# Patient Record
Sex: FEMALE | Race: BLACK OR AFRICAN AMERICAN | Employment: UNEMPLOYED | URBAN - METROPOLITAN AREA
[De-identification: names, ages, dates, MRNs, and addresses within clinical notes are randomized per-mention and may not be internally consistent; named-entity substitution may affect disease eponyms.]

---

## 2017-05-16 ENCOUNTER — HOSPITAL ENCOUNTER (OUTPATIENT)
Dept: LAB | Age: 57
Discharge: HOME | End: 2017-05-16

## 2017-05-16 LAB
CREAT UR-MCNC: 275 MG/DL
MICROALBUMIN UR-MCNC: 15.9 MG/DL
MICROALBUMIN/CREAT UR-RTO: 58 MG/G (ref 0–30)

## 2017-05-16 PROCEDURE — 82570 ASSAY OF URINE CREATININE: CPT | Performed by: INTERNAL MEDICINE

## 2017-05-16 PROCEDURE — 82043 UR ALBUMIN QUANTITATIVE: CPT | Performed by: INTERNAL MEDICINE

## 2018-03-05 ENCOUNTER — HOSPITAL ENCOUNTER (OUTPATIENT)
Dept: ULTRASOUND IMAGING | Age: 58
Discharge: HOME | End: 2018-03-05
Attending: GENERAL ACUTE CARE HOSPITAL
Payer: MEDICARE

## 2018-03-05 DIAGNOSIS — N20.0 CALCULUS OF KIDNEY: ICD-10-CM

## 2018-03-05 PROCEDURE — 76770 US EXAM ABDO BACK WALL COMP: CPT

## 2019-08-14 ENCOUNTER — HOSPITAL ENCOUNTER (OUTPATIENT)
Dept: MAMMOGRAPHY | Age: 59
Discharge: HOME | End: 2019-08-14
Attending: INTERNAL MEDICINE
Payer: MEDICARE

## 2019-08-14 DIAGNOSIS — Z12.39 SCREENING BREAST EXAMINATION: ICD-10-CM

## 2019-08-14 PROCEDURE — 77067 SCR MAMMO BI INCL CAD: CPT

## 2020-08-18 ENCOUNTER — HOSPITAL ENCOUNTER (OUTPATIENT)
Dept: MAMMOGRAPHY | Age: 60
Discharge: HOME | End: 2020-08-18
Attending: INTERNAL MEDICINE
Payer: MEDICARE

## 2020-08-18 DIAGNOSIS — Z12.31 SCREENING MAMMOGRAM FOR HIGH-RISK PATIENT: ICD-10-CM

## 2020-08-18 PROCEDURE — 77067 SCR MAMMO BI INCL CAD: CPT

## 2020-08-25 ENCOUNTER — HOSPITAL ENCOUNTER (OUTPATIENT)
Dept: MAMMOGRAPHY | Age: 60
Discharge: HOME | End: 2020-08-25
Attending: INTERNAL MEDICINE
Payer: MEDICARE

## 2020-08-25 DIAGNOSIS — R92.8 ABNORMALITY OF LEFT BREAST ON SCREENING MAMMOGRAM: ICD-10-CM

## 2020-08-25 PROCEDURE — 77065 DX MAMMO INCL CAD UNI: CPT

## 2020-08-25 NOTE — PROGRESS NOTES
Spoke with Melodie at [facility] at she scheduled patient for left breast biopsy. She will have the order faxed.

## 2020-09-14 ENCOUNTER — HOSPITAL ENCOUNTER (OUTPATIENT)
Dept: MAMMOGRAPHY | Age: 60
Discharge: HOME | End: 2020-09-14
Attending: INTERNAL MEDICINE
Payer: MEDICARE

## 2020-09-14 DIAGNOSIS — R92.1 BREAST CALCIFICATIONS: ICD-10-CM

## 2020-09-14 PROCEDURE — 77065 DX MAMMO INCL CAD UNI: CPT

## 2020-09-14 PROCEDURE — 88360 TUMOR IMMUNOHISTOCHEM/MANUAL: CPT

## 2020-09-14 PROCEDURE — 88305 TISSUE EXAM BY PATHOLOGIST: CPT

## 2020-09-14 PROCEDURE — 74011000250 HC RX REV CODE- 250: Performed by: RADIOLOGY

## 2020-09-14 PROCEDURE — 19081 BX BREAST 1ST LESION STRTCTC: CPT

## 2020-09-14 RX ORDER — LIDOCAINE HYDROCHLORIDE 10 MG/ML
15 INJECTION INFILTRATION; PERINEURAL
Status: COMPLETED | OUTPATIENT
Start: 2020-09-14 | End: 2020-09-14

## 2020-09-14 RX ORDER — LIDOCAINE HYDROCHLORIDE AND EPINEPHRINE 10; 10 MG/ML; UG/ML
10 INJECTION, SOLUTION INFILTRATION; PERINEURAL ONCE
Status: COMPLETED | OUTPATIENT
Start: 2020-09-14 | End: 2020-09-14

## 2020-09-14 RX ADMIN — LIDOCAINE HYDROCHLORIDE,EPINEPHRINE BITARTRATE 100 MG: 10; .01 INJECTION, SOLUTION INFILTRATION; PERINEURAL at 11:05

## 2020-09-14 RX ADMIN — LIDOCAINE HYDROCHLORIDE 15 ML: 10 INJECTION, SOLUTION INFILTRATION; PERINEURAL at 11:05

## 2020-09-16 NOTE — PROGRESS NOTES
Dr. Spencer Raymond called patient and provided pathology results. Called patient with surgical consult appointment information, Dr. Bertin Tavarez 9/21 at 4 pm. Patient reports that biopsy site is healing well and she has no concerns. Encouraged her to call with any questions.

## 2020-09-21 ENCOUNTER — OFFICE VISIT (OUTPATIENT)
Dept: SURGERY | Age: 60
End: 2020-09-21
Payer: MEDICARE

## 2020-09-21 VITALS — SYSTOLIC BLOOD PRESSURE: 167 MMHG | HEART RATE: 71 BPM | TEMPERATURE: 98.8 F | DIASTOLIC BLOOD PRESSURE: 94 MMHG

## 2020-09-21 DIAGNOSIS — D05.12 DUCTAL CARCINOMA IN SITU (DCIS) OF LEFT BREAST: Primary | ICD-10-CM

## 2020-09-21 PROCEDURE — 76642 ULTRASOUND BREAST LIMITED: CPT | Performed by: SURGERY

## 2020-09-21 PROCEDURE — 99205 OFFICE O/P NEW HI 60 MIN: CPT | Performed by: SURGERY

## 2020-09-21 RX ORDER — GUAIFENESIN 100 MG/5ML
81 LIQUID (ML) ORAL DAILY
COMMUNITY
End: 2020-10-13

## 2020-09-21 RX ORDER — ATORVASTATIN CALCIUM 40 MG/1
40 TABLET, FILM COATED ORAL DAILY
COMMUNITY
Start: 2019-04-17

## 2020-09-21 RX ORDER — AMLODIPINE AND OLMESARTAN MEDOXOMIL 10; 20 MG/1; MG/1
1 TABLET ORAL DAILY
COMMUNITY
Start: 2019-04-17 | End: 2020-10-05

## 2020-09-21 RX ORDER — METFORMIN HYDROCHLORIDE 1000 MG/1
1000 TABLET ORAL 2 TIMES DAILY
COMMUNITY
Start: 2020-06-30

## 2020-09-21 RX ORDER — METOPROLOL TARTRATE 25 MG/1
25 TABLET, FILM COATED ORAL DAILY
COMMUNITY
Start: 2019-04-17

## 2020-09-21 RX ORDER — HYDROCHLOROTHIAZIDE 12.5 MG/1
12.5 CAPSULE ORAL DAILY
COMMUNITY
Start: 2019-04-17

## 2020-09-21 NOTE — PROGRESS NOTES
HISTORY OF PRESENT ILLNESS  Benjamin Andersen is a 60 y.o. female. HPI NEW Patient presents for consultation at the request of Dr. Genna To for newly diagnosed LEFT breast DCIS. This was detected from mammogram which showed calcs and she had a stereotactic biopsy. No pain at biopsy site. No history of prior breast biopsies. Breast cancer-  9/14/2020 - LEFT breast biopsy for calcs showed DCIS, intermediate grade, ER positive 100%    Family history-  Sister diagnosed with breast cancer 61. Still living. Two maternal cousins (who were sisters) were diagnosed with breast cancer in their 63's. Both still living. Breast imaging-  XAIO Results (most recent):  Results from Hospital Encounter encounter on 09/14/20   XIAO POST BX IMAGING LT INCL CAD    Addendum Addendum:  PATHOLOGY CORRELATION . INDICATION:  Patient recently underwent core needle biopsy of the left breast. . FINDINGS:  The pathology report reveals ductal carcinoma in situ. Raheel Alejo IMPRESSION:  This is a concordant result. Raheel Alejo RECOMMENDATION:  Surgical consultation. . Results of this biopsy and recommendation were discussed with the patient  by telephone by myself      Ivon Pro MD 9/16/2020  2:40 PM          Narrative INDICATION: Concerning calcifications identified on recent mammogram.  COMPARISON: Previous mammogram, 8/25/2020. Raheel Alejo PROCEDURE:          After obtaining informed consent, and performing a \"time-out\" procedure, the  patient was placed on the stereotactic table with the left/right  breast in the  CC position.  images were obtained. Using sterile technique and less  than 10 cc's lidocaine for local anesthesia, a small skin incision was made and  the vacuum-assisted Suros core needle was advanced into the breast.  Appropriate  needle position with respect to the calcifications in the 12 o'clock position  was documented with pre and post-fire images. Multiple cores were taken. Raheel Alejo   Specimen radiography shows that calcifications were present in the cores  . A biopsy clip was placed to merari the biopsy site for future localization  purposes and its position confirmed with post-procedure digital mammograms. .  There were no apparent complications. .    Impression IMPRESSION:  Stereotactic biopsy with Hydromark clip placement and pathology pending. Further  recommendations will be based on final pathology results. Past Medical History:   Diagnosis Date    Diabetes ([address]     Hypertension      History reviewed. No pertinent surgical history. Social History     Socioeconomic History    Marital status:      Spouse name: Not on file    Number of children: Not on file    Years of education: Not on file    Highest education level: Not on file   Occupational History    Not on file   Social Needs    Financial resource strain: Not on file    Food insecurity     Worry: Not on file     Inability: Not on file    Transportation needs     Medical: Not on file     Non-medical: Not on file   Tobacco Use    Smoking status: Not on file   Substance and Sexual Activity    Alcohol use: Not on file    Drug use: Not on file    Sexual activity: Not on file   Lifestyle    Physical activity     Days per week: Not on file     Minutes per session: Not on file    Stress: Not on file   Relationships    Social connections     Talks on phone: Not on file     Gets together: Not on file     Attends Synagogue service: Not on file     Active member of club or organization: Not on file     Attends meetings of clubs or organizations: Not on file     Relationship status: Not on file    Intimate partner violence     Fear of current or ex partner: Not on file     Emotionally abused: Not on file     Physically abused: Not on file     Forced sexual activity: Not on file   Other Topics Concern    Not on file   Social History Narrative    Not on file     OB History    No obstetric history on file. Obstetric Comments   Menarche:  7. LMP: 48.   # of Children:  0. Age at Delivery of First Child:  n/a. Hysterectomy/oophorectomy:  NO/NO. Breast Bx:  yes. Hx of Breast Feeding:  no. BCP:  no. Hormone therapy:  no.                  Current Outpatient Medications:     amLODIPine-Olmesartan 10-20 mg tab, Take 1 Tab by mouth daily. , Disp: , Rfl:     aspirin 81 mg chewable tablet, Take 81 mg by mouth daily. , Disp: , Rfl:     atorvastatin (LIPITOR) 40 mg tablet, Take 40 mg by mouth nightly., Disp: , Rfl:     hydroCHLOROthiazide (MICROZIDE) 12.5 mg capsule, Take 12.5 mg by mouth daily. , Disp: , Rfl:     metFORMIN (GLUCOPHAGE) 1,000 mg tablet, , Disp: , Rfl:     metoprolol tartrate (LOPRESSOR) 25 mg tablet, Take 25 mg by mouth daily. , Disp: , Rfl:   No Known Allergies  Review of Systems   Constitutional: Negative. HENT: Negative. Eyes: Negative. Respiratory: Negative. Cardiovascular: Negative. Gastrointestinal: Negative. Genitourinary: Negative. Musculoskeletal: Negative. Skin: Negative. Neurological: Negative. Endo/Heme/Allergies: Negative. Psychiatric/Behavioral: Negative. All other systems reviewed and are negative. Physical Exam  Vitals signs and nursing note reviewed. Constitutional:       Appearance: She is well-developed. HENT:      Head: Normocephalic. Neck:      Thyroid: No thyromegaly. Pulmonary:      Effort: Pulmonary effort is normal.   Chest:      Breasts: Breasts are symmetrical.         Right: No inverted nipple, mass, nipple discharge, skin change or tenderness. Left: No inverted nipple, mass, nipple discharge, skin change or tenderness. Abdominal:      Palpations: Abdomen is soft. Musculoskeletal: Normal range of motion. Lymphadenopathy:      Cervical: No cervical adenopathy. Skin:     General: Skin is warm and dry. Neurological:      Mental Status: She is alert and oriented to person, place, and time.        BREAST ULTRASOUND, Preop planning  Indication:preop planning  left Breast 12:00   Technique: The area was scanned using a high-frequency linear-array near-field transducer  Findings: clip and biopsy site identified  Impression: Biopsy site visible with ultrasound  Disposition:  Will schedule lumpectomy   ASSESSMENT and PLAN    ICD-10-CM ICD-9-CM    1. Ductal carcinoma in situ (DCIS) of left breast  D05.12 233.0      - 59 yo female with DCIS left breast Er +   She is here alone  I have reviewed the imaging and pathology with her and she was given copies of these reports. 90 minutes were spent face-to-face with the patient during this encounter and 90% of that time was spent on counseling and coordination of care. 1. Discussed lumpectomy and radiation vs mastectomy. Discussed reconstruction. MRI ordered to see if patient is a candidate for a lumpectomy. 2. Discussed sentinel lymph node biopsy. Not for dcis  3. Discussed external beam radiation. 4. Discussed hormone therapy. 5. Discussed the possibility of chemotherapy. Not for dcis    Will schedule left us guided lumpectomy  Discussed genetic testing  Discussed mri but due to habitus not feasible.

## 2020-09-21 NOTE — PATIENT INSTRUCTIONS

## 2020-09-21 NOTE — H&P (VIEW-ONLY)
HISTORY OF PRESENT ILLNESS Diandra Smith is a 60 y.o. female. HPI NEW Patient presents for consultation at the request of Dr. Amy Michael for newly diagnosed LEFT breast DCIS. This was detected from mammogram which showed calcs and she had a stereotactic biopsy. No pain at biopsy site. No history of prior breast biopsies. Breast cancer- 
9/14/2020 - LEFT breast biopsy for calcs showed DCIS, intermediate grade, ER positive 100% Family history- 
Sister diagnosed with breast cancer 61. Still living. Two maternal cousins (who were sisters) were diagnosed with breast cancer in their 63's. Both still living. Breast imaging- 
XIAO Results (most recent): 
Results from Hospital Encounter encounter on 09/14/20 XIAO POST BX IMAGING LT INCL CAD Addendum Addendum:  PATHOLOGY CORRELATION . INDICATION:  Patient recently underwent core needle biopsy of the left breast. . FINDINGS:  The pathology report reveals ductal carcinoma in situ. Ludmila Amos IMPRESSION:  This is a concordant result. Ludmila Amos RECOMMENDATION:  Surgical consultation. . Results of this biopsy and recommendation were discussed with the patient  by telephone by myself      Eddi Vieira MD 9/16/2020  2:40 PM        
 Narrative INDICATION: Concerning calcifications identified on recent mammogram. 
COMPARISON: Previous mammogram, 8/25/2020. Ludmila Amos PROCEDURE:      
   After obtaining informed consent, and performing a \"time-out\" procedure, the 
patient was placed on the stereotactic table with the left/right  breast in the 
CC position.  images were obtained. Using sterile technique and less 
than 10 cc's lidocaine for local anesthesia, a small skin incision was made and 
the vacuum-assisted Suros core needle was advanced into the breast.  Appropriate 
needle position with respect to the calcifications in the 12 o'clock position 
was documented with pre and post-fire images. Multiple cores were taken. Ludmila Amos Specimen radiography shows that calcifications were present in the cores Osie Ra A biopsy clip was placed to merari the biopsy site for future localization 
purposes and its position confirmed with post-procedure digital mammograms. . 
There were no apparent complications. .  
 Impression IMPRESSION: 
Stereotactic biopsy with Hydromark clip placement and pathology pending. Further 
recommendations will be based on final pathology results. Past Medical History:  
Diagnosis Date  Diabetes ([facility] [address]  Hypertension History reviewed. No pertinent surgical history. Social History Socioeconomic History  Marital status:  Spouse name: Not on file  Number of children: Not on file  Years of education: Not on file  Highest education level: Not on file Occupational History  Not on file Social Needs  Financial resource strain: Not on file  Food insecurity Worry: Not on file Inability: Not on file  Transportation needs Medical: Not on file Non-medical: Not on file Tobacco Use  Smoking status: Not on file Substance and Sexual Activity  Alcohol use: Not on file  Drug use: Not on file  Sexual activity: Not on file Lifestyle  Physical activity Days per week: Not on file Minutes per session: Not on file  Stress: Not on file Relationships  Social connections Talks on phone: Not on file Gets together: Not on file Attends Zoroastrian service: Not on file Active member of club or organization: Not on file Attends meetings of clubs or organizations: Not on file Relationship status: Not on file  Intimate partner violence Fear of current or ex partner: Not on file Emotionally abused: Not on file Physically abused: Not on file Forced sexual activity: Not on file Other Topics Concern  Not on file Social History Narrative  Not on file OB History No obstetric history on file. Obstetric Comments Menarche:  9. LMP: 48.  # of Children:  0. Age at Delivery of First Child:  n/a. Hysterectomy/oophorectomy:  NO/NO. Breast Bx:  yes. Hx of Breast Feeding:  no. BCP:  no. Hormone therapy:  no.  
 
  
  
  
 
 
Current Outpatient Medications:  
  amLODIPine-Olmesartan 10-20 mg tab, Take 1 Tab by mouth daily. , Disp: , Rfl:  
  aspirin 81 mg chewable tablet, Take 81 mg by mouth daily. , Disp: , Rfl:  
  atorvastatin (LIPITOR) 40 mg tablet, Take 40 mg by mouth nightly., Disp: , Rfl:  
  hydroCHLOROthiazide (MICROZIDE) 12.5 mg capsule, Take 12.5 mg by mouth daily. , Disp: , Rfl:  
  metFORMIN (GLUCOPHAGE) 1,000 mg tablet, , Disp: , Rfl:  
  metoprolol tartrate (LOPRESSOR) 25 mg tablet, Take 25 mg by mouth daily. , Disp: , Rfl:  
No Known Allergies Review of Systems Constitutional: Negative. HENT: Negative. Eyes: Negative. Respiratory: Negative. Cardiovascular: Negative. Gastrointestinal: Negative. Genitourinary: Negative. Musculoskeletal: Negative. Skin: Negative. Neurological: Negative. Endo/Heme/Allergies: Negative. Psychiatric/Behavioral: Negative. All other systems reviewed and are negative. Physical Exam 
Vitals signs and nursing note reviewed. Constitutional:   
   Appearance: She is well-developed. HENT:  
   Head: Normocephalic. Neck:  
   Thyroid: No thyromegaly. Pulmonary:  
   Effort: Pulmonary effort is normal.  
Chest:  
   Breasts: Breasts are symmetrical.  
      Right: No inverted nipple, mass, nipple discharge, skin change or tenderness. Left: No inverted nipple, mass, nipple discharge, skin change or tenderness. Abdominal:  
   Palpations: Abdomen is soft. Musculoskeletal: Normal range of motion. Lymphadenopathy:  
   Cervical: No cervical adenopathy. Skin: 
   General: Skin is warm and dry. Neurological:  
   Mental Status: She is alert and oriented to person, place, and time. BREAST ULTRASOUND, Preop planning Indication:preop planning  left Breast 12:00 Technique: The area was scanned using a high-frequency linear-array near-field transducer Findings: clip and biopsy site identified Impression: Biopsy site visible with ultrasound Disposition:  Will schedule lumpectomy ASSESSMENT and PLAN 
  ICD-10-CM ICD-9-CM 1. Ductal carcinoma in situ (DCIS) of left breast  D05.12 233.0   
 
- 59 yo female with DCIS left breast Er + She is here alone I have reviewed the imaging and pathology with her and she was given copies of these reports. 90 minutes were spent face-to-face with the patient during this encounter and 90% of that time was spent on counseling and coordination of care. 1. Discussed lumpectomy and radiation vs mastectomy. Discussed reconstruction. MRI ordered to see if patient is a candidate for a lumpectomy. 2. Discussed sentinel lymph node biopsy. Not for dcis 3. Discussed external beam radiation. 4. Discussed hormone therapy. 5. Discussed the possibility of chemotherapy. Not for dcis Will schedule left us guided lumpectomy Discussed genetic testing Discussed mri but due to habitus not feasible.

## 2020-09-21 NOTE — LETTER
9/23/20 Patient: Augie Dear YOB: 1960 Date of Visit: 9/21/2020 Fab Cosme MD 
[address] [facility] [address] VIA Facsimile: [phone] Dear Fab Cosme MD, Thank you for referring Ms. Ghosh Dear to [address] for evaluation. My notes for this consultation are attached. If you have questions, please do not hesitate to call me. I look forward to following your patient along with you. Sincerely, Reza Adair MD

## 2020-09-29 ENCOUNTER — DOCUMENTATION ONLY (OUTPATIENT)
Dept: SURGERY | Age: 60
End: 2020-09-29

## 2020-09-30 DIAGNOSIS — D05.12 DUCTAL CARCINOMA IN SITU OF LEFT BREAST: Primary | ICD-10-CM

## 2020-09-30 NOTE — PROGRESS NOTES
Verbally informed patient surgery [facility]    COVID TESTING 4 days before surgery. Nurse will call. Date: 10/13/2020 @ 8:45 AM  Arrive: 7:00 AM  report to [facility] and call [phone]  outpatient registration day of surgery. they wll give directions.     PAT: nurse calls   Arrive: nurse calls  report to [address] [place] [address], [address]; [phone] following instructions:  NPO after Midnight the night before  Shower in AM, no lotion, deodorant, powder,perfume or makeup  Will need  morning of the surgery    Post op appointment :10/26/2020 at [facility] 10:45 AM

## 2020-10-05 ENCOUNTER — HOSPITAL ENCOUNTER (OUTPATIENT)
Dept: PREADMISSION TESTING | Age: 60
Discharge: HOME | End: 2020-10-05
Payer: MEDICARE

## 2020-10-05 VITALS
SYSTOLIC BLOOD PRESSURE: 167 MMHG | HEIGHT: 64 IN | WEIGHT: 226.19 LBS | DIASTOLIC BLOOD PRESSURE: 86 MMHG | TEMPERATURE: 98 F | HEART RATE: 67 BPM | RESPIRATION RATE: 20 BRPM | BODY MASS INDEX: 38.62 KG/M2

## 2020-10-05 LAB
ANION GAP SERPL CALC-SCNC: 4 MMOL/L (ref 5–15)
ATRIAL RATE: 53 BPM
BASOPHILS # BLD: 0.1 K/UL (ref 0–0.1)
BASOPHILS NFR BLD: 1 % (ref 0–1)
BUN SERPL-MCNC: 21 MG/DL (ref 6–20)
BUN/CREAT SERPL: 15 (ref 12–20)
CALCIUM SERPL-MCNC: 9.1 MG/DL (ref 8.5–10.1)
CALCULATED P AXIS, ECG09: 28 DEGREES
CALCULATED T AXIS, ECG11: 77 DEGREES
CHLORIDE SERPL-SCNC: 108 MMOL/L (ref 97–108)
CO2 SERPL-SCNC: 29 MMOL/L (ref 21–32)
CREAT SERPL-MCNC: 1.39 MG/DL (ref 0.55–1.02)
DIAGNOSIS, 93000: NORMAL
DIFFERENTIAL METHOD BLD: ABNORMAL
EOSINOPHIL # BLD: 0.2 K/UL (ref 0–0.4)
EOSINOPHIL NFR BLD: 3 % (ref 0–7)
ERYTHROCYTE [DISTWIDTH] IN BLOOD BY AUTOMATED COUNT: 13.8 % (ref 11.5–14.5)
EST. AVERAGE GLUCOSE BLD GHB EST-MCNC: 117 MG/DL
GLUCOSE SERPL-MCNC: 128 MG/DL (ref 65–100)
HBA1C MFR BLD: 5.7 % (ref 4–5.6)
HCT VFR BLD AUTO: 35.4 % (ref 35–47)
HGB BLD-MCNC: 10.7 G/DL (ref 11.5–16)
IMM GRANULOCYTES # BLD AUTO: 0 K/UL (ref 0–0.04)
IMM GRANULOCYTES NFR BLD AUTO: 0 % (ref 0–0.5)
LYMPHOCYTES # BLD: 2.1 K/UL (ref 0.8–3.5)
LYMPHOCYTES NFR BLD: 39 % (ref 12–49)
MCH RBC QN AUTO: 27.2 PG (ref 26–34)
MCHC RBC AUTO-ENTMCNC: 30.2 G/DL (ref 30–36.5)
MCV RBC AUTO: 90.1 FL (ref 80–99)
MONOCYTES # BLD: 0.3 K/UL (ref 0–1)
MONOCYTES NFR BLD: 6 % (ref 5–13)
NEUTS SEG # BLD: 2.8 K/UL (ref 1.8–8)
NEUTS SEG NFR BLD: 51 % (ref 32–75)
NRBC # BLD: 0 K/UL (ref 0–0.01)
NRBC BLD-RTO: 0 PER 100 WBC
P-R INTERVAL, ECG05: 148 MS
PLATELET # BLD AUTO: 419 K/UL (ref 150–400)
PMV BLD AUTO: 11 FL (ref 8.9–12.9)
POTASSIUM SERPL-SCNC: 3.9 MMOL/L (ref 3.5–5.1)
Q-T INTERVAL, ECG07: 456 MS
QRS DURATION, ECG06: 86 MS
QTC CALCULATION (BEZET), ECG08: 427 MS
RBC # BLD AUTO: 3.93 M/UL (ref 3.8–5.2)
SODIUM SERPL-SCNC: 141 MMOL/L (ref 136–145)
VENTRICULAR RATE, ECG03: 53 BPM
WBC # BLD AUTO: 5.5 K/UL (ref 3.6–11)

## 2020-10-05 PROCEDURE — 83036 HEMOGLOBIN GLYCOSYLATED A1C: CPT

## 2020-10-05 PROCEDURE — 93005 ELECTROCARDIOGRAM TRACING: CPT

## 2020-10-05 PROCEDURE — 80048 BASIC METABOLIC PNL TOTAL CA: CPT

## 2020-10-05 PROCEDURE — 85025 COMPLETE CBC W/AUTO DIFF WBC: CPT

## 2020-10-05 RX ORDER — AMLODIPINE AND BENAZEPRIL HYDROCHLORIDE 10; 20 MG/1; MG/1
1 CAPSULE ORAL DAILY
COMMUNITY

## 2020-10-06 ENCOUNTER — DOCUMENTATION ONLY (OUTPATIENT)
Dept: SURGERY | Age: 60
End: 2020-10-06

## 2020-10-06 NOTE — PERIOP NOTES
Message left on nurse voicemail - Dr. Kennedy Patton office re: abnormal labs glucose 128, BUN 21, creatinine 1.39, Hgb 10.7.

## 2020-10-06 NOTE — PROGRESS NOTES
Received voicemail from [facility] PAT. Patient has abnormal labs -     Glucose 128  BUN 21  Creatinine 1.39  GFR low  Hgb low    Dr. Glenroy Fofana informed.

## 2020-10-09 ENCOUNTER — HOSPITAL ENCOUNTER (OUTPATIENT)
Dept: PREADMISSION TESTING | Age: 60
Discharge: HOME | End: 2020-10-09
Payer: MEDICARE

## 2020-10-09 ENCOUNTER — TRANSCRIBE ORDER (OUTPATIENT)
Dept: REGISTRATION | Age: 60
End: 2020-10-09

## 2020-10-09 DIAGNOSIS — Z01.812 PRE-PROCEDURE LAB EXAM: Primary | ICD-10-CM

## 2020-10-09 DIAGNOSIS — Z01.812 PRE-PROCEDURE LAB EXAM: ICD-10-CM

## 2020-10-09 PROCEDURE — 87635 SARS-COV-2 COVID-19 AMP PRB: CPT

## 2020-10-10 LAB — SARS-COV-2, COV2NT: NOT DETECTED

## 2020-10-12 ENCOUNTER — ANESTHESIA EVENT (OUTPATIENT)
Dept: MEDSURG UNIT | Age: 60
End: 2020-10-12
Payer: MEDICARE

## 2020-10-12 RX ORDER — MORPHINE SULFATE 10 MG/ML
2 INJECTION, SOLUTION INTRAMUSCULAR; INTRAVENOUS
Status: CANCELLED | OUTPATIENT
Start: 2020-10-12

## 2020-10-12 RX ORDER — SODIUM CHLORIDE 0.9 % (FLUSH) 0.9 %
5-40 SYRINGE (ML) INJECTION EVERY 8 HOURS
Status: CANCELLED | OUTPATIENT
Start: 2020-10-12

## 2020-10-12 RX ORDER — HYDROMORPHONE HYDROCHLORIDE 1 MG/ML
0.2 INJECTION, SOLUTION INTRAMUSCULAR; INTRAVENOUS; SUBCUTANEOUS
Status: CANCELLED | OUTPATIENT
Start: 2020-10-12

## 2020-10-12 RX ORDER — ONDANSETRON 2 MG/ML
4 INJECTION INTRAMUSCULAR; INTRAVENOUS AS NEEDED
Status: CANCELLED | OUTPATIENT
Start: 2020-10-12

## 2020-10-12 RX ORDER — FENTANYL CITRATE 50 UG/ML
25 INJECTION, SOLUTION INTRAMUSCULAR; INTRAVENOUS
Status: CANCELLED | OUTPATIENT
Start: 2020-10-12

## 2020-10-12 RX ORDER — OXYCODONE AND ACETAMINOPHEN 5; 325 MG/1; MG/1
1 TABLET ORAL AS NEEDED
Status: CANCELLED | OUTPATIENT
Start: 2020-10-12

## 2020-10-12 RX ORDER — SODIUM CHLORIDE 0.9 % (FLUSH) 0.9 %
5-40 SYRINGE (ML) INJECTION AS NEEDED
Status: CANCELLED | OUTPATIENT
Start: 2020-10-12

## 2020-10-12 RX ORDER — SODIUM CHLORIDE, SODIUM LACTATE, POTASSIUM CHLORIDE, CALCIUM CHLORIDE 600; 310; 30; 20 MG/100ML; MG/100ML; MG/100ML; MG/100ML
125 INJECTION, SOLUTION INTRAVENOUS CONTINUOUS
Status: CANCELLED | OUTPATIENT
Start: 2020-10-12

## 2020-10-12 RX ORDER — DIPHENHYDRAMINE HYDROCHLORIDE 50 MG/ML
12.5 INJECTION, SOLUTION INTRAMUSCULAR; INTRAVENOUS AS NEEDED
Status: CANCELLED | OUTPATIENT
Start: 2020-10-12 | End: 2020-10-12

## 2020-10-12 RX ORDER — MIDAZOLAM HYDROCHLORIDE 1 MG/ML
0.5 INJECTION, SOLUTION INTRAMUSCULAR; INTRAVENOUS
Status: CANCELLED | OUTPATIENT
Start: 2020-10-12

## 2020-10-12 NOTE — PROGRESS NOTES
I called to follow up with patient before her surgery. I just left a message for patient to call if she had any questions.

## 2020-10-13 ENCOUNTER — HOSPITAL ENCOUNTER (OUTPATIENT)
Age: 60
Setting detail: OUTPATIENT SURGERY
Discharge: HOME | End: 2020-10-13
Attending: SURGERY | Admitting: SURGERY
Payer: MEDICARE

## 2020-10-13 ENCOUNTER — ANESTHESIA (OUTPATIENT)
Dept: MEDSURG UNIT | Age: 60
End: 2020-10-13
Payer: MEDICARE

## 2020-10-13 VITALS
RESPIRATION RATE: 22 BRPM | WEIGHT: 226.19 LBS | BODY MASS INDEX: 38.83 KG/M2 | TEMPERATURE: 97 F | OXYGEN SATURATION: 95 % | DIASTOLIC BLOOD PRESSURE: 76 MMHG | HEART RATE: 68 BPM | SYSTOLIC BLOOD PRESSURE: 127 MMHG

## 2020-10-13 DIAGNOSIS — D05.12 DUCTAL CARCINOMA IN SITU OF LEFT BREAST: ICD-10-CM

## 2020-10-13 LAB
GLUCOSE BLD STRIP.AUTO-MCNC: 145 MG/DL (ref 65–100)
GLUCOSE BLD STRIP.AUTO-MCNC: 149 MG/DL (ref 65–100)
SERVICE CMNT-IMP: ABNORMAL
SERVICE CMNT-IMP: ABNORMAL

## 2020-10-13 PROCEDURE — 2709999900 HC NON-CHARGEABLE SUPPLY: Performed by: SURGERY

## 2020-10-13 PROCEDURE — 77030018836 HC SOL IRR NACL ICUM -A: Performed by: SURGERY

## 2020-10-13 PROCEDURE — 88307 TISSUE EXAM BY PATHOLOGIST: CPT

## 2020-10-13 PROCEDURE — 74011250636 HC RX REV CODE- 250/636: Performed by: NURSE ANESTHETIST, CERTIFIED REGISTERED

## 2020-10-13 PROCEDURE — 2709999900 HC NON-CHARGEABLE SUPPLY

## 2020-10-13 PROCEDURE — C9290 INJ, BUPIVACAINE LIPOSOME: HCPCS | Performed by: SURGERY

## 2020-10-13 PROCEDURE — 76060000062 HC AMB SURG ANES 1 TO 1.5 HR: Performed by: SURGERY

## 2020-10-13 PROCEDURE — 77030010509 HC AIRWY LMA MSK TELE -A: Performed by: ANESTHESIOLOGY

## 2020-10-13 PROCEDURE — 76998 US GUIDE INTRAOP: CPT | Performed by: SURGERY

## 2020-10-13 PROCEDURE — 19301 PARTIAL MASTECTOMY: CPT | Performed by: SURGERY

## 2020-10-13 PROCEDURE — 77030040361 HC SLV COMPR DVT MDII -B: Performed by: SURGERY

## 2020-10-13 PROCEDURE — 77030011267 HC ELECTRD BLD COVD -A: Performed by: SURGERY

## 2020-10-13 PROCEDURE — 74011250637 HC RX REV CODE- 250/637: Performed by: ANESTHESIOLOGY

## 2020-10-13 PROCEDURE — 76210000034 HC AMBSU PH I REC 0.5 TO 1 HR: Performed by: SURGERY

## 2020-10-13 PROCEDURE — 76030000001 HC AMB SURG OR TIME 1 TO 1.5: Performed by: SURGERY

## 2020-10-13 PROCEDURE — 74011250636 HC RX REV CODE- 250/636: Performed by: SURGERY

## 2020-10-13 PROCEDURE — 82962 GLUCOSE BLOOD TEST: CPT

## 2020-10-13 PROCEDURE — 77030041680 HC PNCL ELECSURG SMK EVAC CNMD -B: Performed by: SURGERY

## 2020-10-13 PROCEDURE — 77030002996 HC SUT SLK J&J -A: Performed by: SURGERY

## 2020-10-13 PROCEDURE — 74011250636 HC RX REV CODE- 250/636: Performed by: ANESTHESIOLOGY

## 2020-10-13 PROCEDURE — 77030010507 HC ADH SKN DERMBND J&J -B: Performed by: SURGERY

## 2020-10-13 PROCEDURE — 77030011825 HC SUPP SURG PSTOP S2SG -B: Performed by: SURGERY

## 2020-10-13 PROCEDURE — 77030002933 HC SUT MCRYL J&J -A: Performed by: SURGERY

## 2020-10-13 PROCEDURE — 77030040922 HC BLNKT HYPOTHRM STRY -A

## 2020-10-13 PROCEDURE — 74011000250 HC RX REV CODE- 250: Performed by: NURSE ANESTHETIST, CERTIFIED REGISTERED

## 2020-10-13 PROCEDURE — 77030034556 HC PRB MARGN DETECT LUMPECTMY DISP DUNE -G: Performed by: SURGERY

## 2020-10-13 RX ORDER — CEFAZOLIN SODIUM 1 G/3ML
INJECTION, POWDER, FOR SOLUTION INTRAMUSCULAR; INTRAVENOUS AS NEEDED
Status: DISCONTINUED | OUTPATIENT
Start: 2020-10-13 | End: 2020-10-13 | Stop reason: HOSPADM

## 2020-10-13 RX ORDER — SODIUM CHLORIDE 9 MG/ML
25 INJECTION, SOLUTION INTRAVENOUS CONTINUOUS
Status: DISCONTINUED | OUTPATIENT
Start: 2020-10-13 | End: 2020-10-13 | Stop reason: HOSPADM

## 2020-10-13 RX ORDER — FENTANYL CITRATE 50 UG/ML
50 INJECTION, SOLUTION INTRAMUSCULAR; INTRAVENOUS AS NEEDED
Status: DISCONTINUED | OUTPATIENT
Start: 2020-10-13 | End: 2020-10-13 | Stop reason: HOSPADM

## 2020-10-13 RX ORDER — LIDOCAINE HYDROCHLORIDE 10 MG/ML
0.1 INJECTION, SOLUTION EPIDURAL; INFILTRATION; INTRACAUDAL; PERINEURAL AS NEEDED
Status: DISCONTINUED | OUTPATIENT
Start: 2020-10-13 | End: 2020-10-13 | Stop reason: HOSPADM

## 2020-10-13 RX ORDER — MIDAZOLAM HYDROCHLORIDE 1 MG/ML
1 INJECTION, SOLUTION INTRAMUSCULAR; INTRAVENOUS AS NEEDED
Status: DISCONTINUED | OUTPATIENT
Start: 2020-10-13 | End: 2020-10-13 | Stop reason: HOSPADM

## 2020-10-13 RX ORDER — ACETAMINOPHEN 325 MG/1
650 TABLET ORAL ONCE
Status: COMPLETED | OUTPATIENT
Start: 2020-10-13 | End: 2020-10-13

## 2020-10-13 RX ORDER — DEXAMETHASONE SODIUM PHOSPHATE 4 MG/ML
INJECTION, SOLUTION INTRA-ARTICULAR; INTRALESIONAL; INTRAMUSCULAR; INTRAVENOUS; SOFT TISSUE AS NEEDED
Status: DISCONTINUED | OUTPATIENT
Start: 2020-10-13 | End: 2020-10-13 | Stop reason: HOSPADM

## 2020-10-13 RX ORDER — SODIUM CHLORIDE 0.9 % (FLUSH) 0.9 %
5-40 SYRINGE (ML) INJECTION EVERY 8 HOURS
Status: DISCONTINUED | OUTPATIENT
Start: 2020-10-13 | End: 2020-10-13 | Stop reason: HOSPADM

## 2020-10-13 RX ORDER — LIDOCAINE HYDROCHLORIDE 20 MG/ML
INJECTION, SOLUTION EPIDURAL; INFILTRATION; INTRACAUDAL; PERINEURAL AS NEEDED
Status: DISCONTINUED | OUTPATIENT
Start: 2020-10-13 | End: 2020-10-13 | Stop reason: HOSPADM

## 2020-10-13 RX ORDER — SODIUM CHLORIDE, SODIUM LACTATE, POTASSIUM CHLORIDE, CALCIUM CHLORIDE 600; 310; 30; 20 MG/100ML; MG/100ML; MG/100ML; MG/100ML
125 INJECTION, SOLUTION INTRAVENOUS CONTINUOUS
Status: DISCONTINUED | OUTPATIENT
Start: 2020-10-13 | End: 2020-10-13 | Stop reason: HOSPADM

## 2020-10-13 RX ORDER — ONDANSETRON 4 MG/1
4 TABLET, ORALLY DISINTEGRATING ORAL
Qty: 5 TAB | Refills: 1 | OUTPATIENT
Start: 2020-10-13 | End: 2021-01-11

## 2020-10-13 RX ORDER — MIDAZOLAM HYDROCHLORIDE 1 MG/ML
INJECTION, SOLUTION INTRAMUSCULAR; INTRAVENOUS AS NEEDED
Status: DISCONTINUED | OUTPATIENT
Start: 2020-10-13 | End: 2020-10-13 | Stop reason: HOSPADM

## 2020-10-13 RX ORDER — GLYCOPYRROLATE 0.2 MG/ML
INJECTION INTRAMUSCULAR; INTRAVENOUS AS NEEDED
Status: DISCONTINUED | OUTPATIENT
Start: 2020-10-13 | End: 2020-10-13 | Stop reason: HOSPADM

## 2020-10-13 RX ORDER — PROPOFOL 10 MG/ML
INJECTION, EMULSION INTRAVENOUS AS NEEDED
Status: DISCONTINUED | OUTPATIENT
Start: 2020-10-13 | End: 2020-10-13 | Stop reason: HOSPADM

## 2020-10-13 RX ORDER — SODIUM CHLORIDE 0.9 % (FLUSH) 0.9 %
5-40 SYRINGE (ML) INJECTION AS NEEDED
Status: DISCONTINUED | OUTPATIENT
Start: 2020-10-13 | End: 2020-10-13 | Stop reason: HOSPADM

## 2020-10-13 RX ORDER — OXYCODONE AND ACETAMINOPHEN 5; 325 MG/1; MG/1
1 TABLET ORAL
Qty: 30 TAB | Refills: 0 | Status: SHIPPED | OUTPATIENT
Start: 2020-10-13 | End: 2020-10-18

## 2020-10-13 RX ORDER — EPHEDRINE SULFATE/0.9% NACL/PF 50 MG/5 ML
SYRINGE (ML) INTRAVENOUS AS NEEDED
Status: DISCONTINUED | OUTPATIENT
Start: 2020-10-13 | End: 2020-10-13 | Stop reason: HOSPADM

## 2020-10-13 RX ORDER — ONDANSETRON 2 MG/ML
INJECTION INTRAMUSCULAR; INTRAVENOUS AS NEEDED
Status: DISCONTINUED | OUTPATIENT
Start: 2020-10-13 | End: 2020-10-13 | Stop reason: HOSPADM

## 2020-10-13 RX ADMIN — ONDANSETRON HYDROCHLORIDE 4 MG: 2 INJECTION, SOLUTION INTRAMUSCULAR; INTRAVENOUS at 09:18

## 2020-10-13 RX ADMIN — CEFAZOLIN 2 G: 330 INJECTION, POWDER, FOR SOLUTION INTRAMUSCULAR; INTRAVENOUS at 09:15

## 2020-10-13 RX ADMIN — SODIUM CHLORIDE, SODIUM LACTATE, POTASSIUM CHLORIDE, AND CALCIUM CHLORIDE 125 ML/HR: 600; 310; 30; 20 INJECTION, SOLUTION INTRAVENOUS at 08:20

## 2020-10-13 RX ADMIN — Medication 20 MG: at 09:29

## 2020-10-13 RX ADMIN — PROPOFOL 175 MG: 10 INJECTION, EMULSION INTRAVENOUS at 09:07

## 2020-10-13 RX ADMIN — GLYCOPYRROLATE 0.2 MG: 0.2 INJECTION, SOLUTION INTRAMUSCULAR; INTRAVENOUS at 09:17

## 2020-10-13 RX ADMIN — DEXAMETHASONE SODIUM PHOSPHATE 4 MG: 4 INJECTION, SOLUTION INTRAMUSCULAR; INTRAVENOUS at 09:17

## 2020-10-13 RX ADMIN — MIDAZOLAM 2 MG: 1 INJECTION INTRAMUSCULAR; INTRAVENOUS at 09:03

## 2020-10-13 RX ADMIN — ACETAMINOPHEN 650 MG: 325 TABLET ORAL at 08:20

## 2020-10-13 RX ADMIN — LIDOCAINE HYDROCHLORIDE 100 MG: 20 INJECTION, SOLUTION EPIDURAL; INFILTRATION; INTRACAUDAL; PERINEURAL at 09:07

## 2020-10-13 NOTE — ANESTHESIA POSTPROCEDURE EVALUATION
Post-Anesthesia Evaluation and Assessment    Patient: Brandy Cruz MRN: [id number]  SSN: [id number]    YOB: 1960  Age: 60 y.o. Sex: female      I have evaluated the patient and they are stable and ready for discharge from the PACU. Cardiovascular Function/Vital Signs  Visit Vitals  /76 (BP 1 Location: Left arm, BP Patient Position: At rest;Supine)   Pulse 68   Temp 36.1 °C (97 °F)   Resp 22   Wt 102.6 kg (226 lb 3.1 oz)   SpO2 95%   BMI 38.83 kg/m²       Patient is status post General anesthesia for Procedure(s):  LEFT BREAST LUMPECTOMY WITH ULTRASOUND AND MARGIN PROBE. Nausea/Vomiting: None    Postoperative hydration reviewed and adequate. Pain:  Pain Scale 1: Numeric (0 - 10) (10/13/20 1030)  Pain Intensity 1: 0 (10/13/20 1030)   Managed    Neurological Status:   Neuro (WDL): Within Defined Limits (10/13/20 1030)  Neuro  Neurologic State: Alert (10/13/20 1030)  Orientation Level: Oriented X4 (10/13/20 1030)  LUE Motor Response: Purposeful (10/13/20 1030)  LLE Motor Response: Purposeful (10/13/20 1030)  RUE Motor Response: Purposeful (10/13/20 1030)  RLE Motor Response: Purposeful (10/13/20 1030)   At baseline    Mental Status, Level of Consciousness: Alert and  oriented to person, place, and time    Pulmonary Status:   O2 Device: Room air (10/13/20 1030)   Adequate oxygenation and airway patent    Complications related to anesthesia: None    Post-anesthesia assessment completed.  No concerns    Signed By: Candiss Siemens, MD     October 13, 2020              Procedure(s):  LEFT BREAST LUMPECTOMY WITH ULTRASOUND AND MARGIN PROBE.    general    <BSHSIANPOST>    INITIAL Post-op Vital signs:   Vitals Value Taken Time   /76 10/13/2020 10:30 AM   Temp 36.1 °C (97 °F) 10/13/2020 10:04 AM   Pulse 68 10/13/2020 10:30 AM   Resp 22 10/13/2020 10:30 AM   SpO2 95 % 10/13/2020 10:30 AM

## 2020-10-13 NOTE — OP NOTES
[address]   OPERATIVE REPORT    Name:  Cassandra Celeste  MR#:  [id number]  :  1960  ACCOUNT #:  [de-identified]  DATE OF SERVICE:  10/13/2020    PREOPERATIVE DIAGNOSIS:  Left breast ductal carcinoma in situ. POSTOPERATIVE DIAGNOSIS:  Left breast ductal carcinoma in situ. PROCEDURE PERFORMED:  Left breast ultrasound-guided lumpectomy and intraoperative margin assessment using RF spectroscopy. SURGEON:  Satinder Hodges MD    ASSISTANT:  Eric Morin    ANESTHESIA:  General.    COMPLICATIONS:  None. SPECIMENS REMOVED:  1. Left breast lumpectomy. 2.  Lateral margin. 3.  Medial margin. IMPLANTS:  None. ESTIMATED BLOOD LOSS:  Minimal.    DRAINS:  None. FINDINGS:  Clip and biopsy site excised. INDICATIONS FOR PROCEDURE:  This is a 71-year-old female with left breast DCIS. She wished to have a lumpectomy. PROCEDURE IN DETAIL:  The patient was seen in the preoperative holding area where surgical site was marked by surgeon. Informed consent was obtained. She was taken to the operating room and laid in supine position where general endotracheal anesthesia was induced. A time-out was performed. Attention was turned to left breast at 12 o'clock and clip and biopsy cavity were identified using ultrasound guidance. An incision was made with a 15-blade. Bovie cautery was used to dissect down around the lesion all the way to the chest wall. This was excised and marked short superior and long stitch lateral.  MarginProbe device was plugged and calibrated. All 6 margins were assessed. For additional margins, please see above. The cavity was irrigated. Hemostasis was obtained with Bovie cautery. A mixture of 20 mL of Exparel with 30 mL of 0.25% Marcaine was injected into the breast and skin. The deeper tissues were closed with interrupted 2-0 Vicryl and the skin with interrupted 3-0 Vicryl and 4-0 subcuticular Monocryl.   All sponge, needle and instrument counts were correct. The patient went to Recovery in stable condition.       Paramjit Castillo MD      [id number]  D:  10/13/2020 9:56  T:  10/13/2020 10:29  JOB #:  [id number]

## 2020-10-13 NOTE — PERIOP NOTES
Discharge instructions reviewed with patient and sister. Opportunity for questions and clarification provided. Patient and sister verbalized understanding of discharge instructions and had no additional questions or concerns. Patient's vital signs within normal limits. Patient denies post-operative pain. Patient tolerating PO intake, denies nausea. Peripheral IV removed with no signs of infiltration. Patient discharged by RN via wheelchair to sister's care/car and prior home environment from Phase 1 area.

## 2020-10-13 NOTE — ROUTINE PROCESS
Patient: Rebecca Portillo MRN: [id number]  SSN: [id number] YOB: 1960  Age: 60 y.o. Sex: female Patient is status post Procedure(s): LEFT BREAST LUMPECTOMY WITH ULTRASOUND AND MARGIN PROBE. Surgeon(s) and Role: Jordan Condon MD - Primary Local/Dose/Irrigation:   
 
             
Peripheral IV 10/13/20 Right Hand (Active) Site Assessment Clean, dry, & intact 10/13/20 1946 Phlebitis Assessment 0 10/13/20 0819 Infiltration Assessment 0 10/13/20 0819 Dressing Status Clean, dry, & intact 10/13/20 4895 Dressing Type Tape;Transparent 10/13/20 5317 Hub Color/Line Status Blue; Infusing 10/13/20 0819 Dressing/Packing:  Wound Breast Left-Dressing Type: (DERMABOND, 4X4, SURGIBRA) (10/13/20 0900) Splint/Cast:  ] Other:

## 2020-10-13 NOTE — DISCHARGE INSTRUCTIONS
Discharge Instructions from Dr. Watson Halsted    · I will call you with the pathology results, typically within 1 week from today. · You may shower, but no hot tubs, swimming pools, or baths until your incision is healed. · No heavy lifting with the affected extremity (nothing greater than 5 pounds), and limit its use for the next 4-5 days. · You may use an ice pack for comfort for the next couple of days, but do not place ice directly on the skin. Rather, use a towel or clothing to serve as a barrier between skin and ice to prevent injury. · If I placed a drain, follow the drain instructions provided, especially as you keep a record of the drain output. · Follow medication instructions carefully. No aspirin, ibuprofen or aleve x 1 week. May take tylenol instead of narcotic. · Wear surgical bra and dressing for 24 hours, then remove. Wear supportive bra at all times. · You will have bruising and swelling  · Watch for signs of infection as listed below. · Redness  · Swelling  · Drainage from the incision or from your nipple that appears infected  · Fever over 101.5 degrees for consecutive readings, or over 99.5 if you are currently undergoing chemotherapy. · Call our office (number is below) for a follow-up appointment. · If you have any problems, our phone number is [phone]      DISCHARGE SUMMARY from Nurse    You received 650 mg of tylenol (acetaminophen) prior your procedure today at 8:20 AM. Please do not have any additional tylenol (acetaminophen) or tylenol containing products for 6 hours, or no sooner than 2:20 PM.     You had exparel, a long acting local anesthetic or numbing medication, injected into your surgical site during your procedure today. This medication stays in your system for 96 hours, or 4 days to help with post-operative pain control. You have a teal bracelet on your right wrist to indicate this.  Please do not remove this bracelet for 4 days, or until Saturday so that other health care providers can know you have had this medication. PATIENT INSTRUCTIONS:    After general anesthesia or intravenous sedation, for 24 hours or while taking prescription Narcotics:  · Limit your activities  · Do not drive and operate hazardous machinery  · Do not make important personal or business decisions  · Do  not drink alcoholic beverages  · If you have not urinated within 8 hours after discharge, please contact your surgeon on call. Report the following to your surgeon:  · Excessive pain, swelling, redness or odor of or around the surgical area  · Temperature over 101  · Nausea and vomiting lasting longer than 4 hours or if unable to take medications  · Any signs of decreased circulation or nerve impairment to extremity: change in color, persistent  numbness, tingling, coldness or increase pain  · Any questions  If you experience any of the following symptoms as noted above, please follow up with Dr. Gaetano Patel. What to do at Home:  Recommended Activity: See surgical instructions above from Dr. aGetano Patel  Recommended Diet: Resume regular diet as tolerated. Nothing heavy, greasy, fatty, or fried. Please make sure you have food on your stomach prior to taking narcotic pain medication. *  Please give a list of your current medications to your Primary Care Provider. *  Please update this list whenever your medications are discontinued, doses are changed, or new medications (including over-the-counter products) are added. *  Please carry medication information at all times in case of emergency situations. Community Education:    These are general instructions for a healthy lifestyle:    No smoking/ No tobacco products/ Avoid exposure to second hand smoke. Surgeon General's Warning:  Quitting smoking now greatly reduces serious risk to your health. Obesity, smoking, and sedentary lifestyle greatly increases your risk for illness.  A healthy diet, regular physical exercise & weight monitoring are important for maintaining a healthy lifestyle    You may be retaining fluid if you have a history of heart failure or if you experience any of the following symptoms:  Weight gain of 3 pounds or more overnight or 5 pounds in a week, increased swelling in our hands or feet or shortness of breath while lying flat in bed. Please call your doctor as soon as you notice any of these symptoms; do not wait until your next office visit. The discharge information has been reviewed with the patient and caregiver. The patient and caregiver verbalized understanding. Discharge medications reviewed with the patient and caregiver and appropriate educational materials and side effects teaching were provided.   ___________________________________________________________________________________________________________________________________

## 2020-10-13 NOTE — BRIEF OP NOTE
Brief Postoperative Note    Patient: Akila Hernandez  YOB: 1960  MRN: [id number]    Date of Procedure: 10/13/2020     Pre-Op Diagnosis: LEFT BREAST DUCTAL CARCINOMA IN SITU    Post-Op Diagnosis: Same as preoperative diagnosis. Procedure(s):  LEFT BREAST LUMPECTOMY WITH ULTRASOUND AND MARGIN PROBE    Surgeon(s):   Erlinda Davey MD    Surgical Assistant: None    Anesthesia: General     Estimated Blood Loss (mL): Minimal    Complications: none    Specimens:   ID Type Source Tests Collected by Time Destination   1 : LEFT BREAST TISSUE, LATERAL MARGIN, STITCH ON NEW MARGIN Fresh Breast  Erlinda Davey MD 10/13/2020 0932 Pathology   2 : LEFT BREAST LUMPECTOMY, SHORT STITCH SUPERIOR, LONG IS LATERAL [facility]o Breast  Erlinda Davey MD 10/13/2020 1514 Pathology   3 : LEFT BREAST TISSUE, MEDIAL MARGIN, STITCH IS ON NEW MARGIN Fresh Breast  Erlinda Davey MD 10/13/2020 4544 Pathology        Implants: * No implants in log *    Drains: * No LDAs found *    Findings: clip and biopsy site excised     Electronically Signed by Christian Moralez MD on 10/13/2020 at 9:53 AM  Dictated stat

## 2020-10-13 NOTE — INTERVAL H&P NOTE
Update History & Physical 
 
The Patient's History and Physical of 9/21/2020 Left us guided lumpectomy was reviewed with the patient and I examined the patient. There was no change. The surgical site was confirmed by the patient and me. Plan:  The risk, benefits, expected outcome, and alternative to the recommended procedure have been discussed with the patient. Patient understands and wants to proceed with the procedure.  
 
Electronically signed by Satinder Hodges MD on 10/13/2020 at 7:25 AM

## 2020-10-13 NOTE — ANESTHESIA PREPROCEDURE EVALUATION
Relevant Problems   No relevant active problems       Anesthetic History   No history of anesthetic complications            Review of Systems / Medical History  Patient summary reviewed, nursing notes reviewed and pertinent labs reviewed    Pulmonary  Within defined limits                 Neuro/Psych   Within defined limits           Cardiovascular    Hypertension: well controlled          CAD and cardiac stents    Exercise tolerance: >4 METS  Comments: No CP/SOB; able to walk and climb stairs w/o diff. No card sxs since stents placed in 2009   GI/Hepatic/Renal         Renal disease: CRI       Endo/Other    Diabetes: type 2    Obesity     Other Findings            Physical Exam    Airway  Mallampati: II  TM Distance: > 6 cm  Neck ROM: normal range of motion   Mouth opening: Normal     Cardiovascular  Regular rate and rhythm,  S1 and S2 normal,  no murmur, click, rub, or gallop             Dental    Dentition: Poor dentition     Pulmonary  Breath sounds clear to auscultation               Abdominal  GI exam deferred       Other Findings            Anesthetic Plan    ASA: 3  Anesthesia type: general          Induction: Intravenous  Anesthetic plan and risks discussed with: Patient      Advised pt to see cardiologist for f/u.

## 2020-10-14 ENCOUNTER — TELEPHONE (OUTPATIENT)
Dept: SURGERY | Age: 60
End: 2020-10-14

## 2020-10-14 NOTE — TELEPHONE ENCOUNTER
I called patient POD 1. She is doing well. No questions at this time. She is aware that Dr. Watson Halsted will be calling her with surg path when they become available. She is asking for a . Will route this to Luanne Razo.

## 2020-10-16 NOTE — PROGRESS NOTES
Called with surg path  dcis  Margins clear  Left vm per hipaa  Nurses may give info if she calls back

## 2020-10-21 ENCOUNTER — TELEPHONE (OUTPATIENT)
Dept: SURGERY | Age: 60
End: 2020-10-21

## 2020-10-21 NOTE — TELEPHONE ENCOUNTER
Returned pt's call. She continues to have a little bit of pain post operatively. She is all out of post op pain meds. I recommended she use tylenol and/or ibuprofen OTC. She understands and will do this.

## 2020-10-26 ENCOUNTER — OFFICE VISIT (OUTPATIENT)
Dept: SURGERY | Age: 60
End: 2020-10-26
Payer: MEDICARE

## 2020-10-26 VITALS
BODY MASS INDEX: 38.58 KG/M2 | TEMPERATURE: 98 F | SYSTOLIC BLOOD PRESSURE: 141 MMHG | HEIGHT: 64 IN | WEIGHT: 226 LBS | HEART RATE: 55 BPM | DIASTOLIC BLOOD PRESSURE: 86 MMHG

## 2020-10-26 DIAGNOSIS — D05.12 DUCTAL CARCINOMA IN SITU (DCIS) OF LEFT BREAST: Primary | ICD-10-CM

## 2020-10-26 PROCEDURE — 99024 POSTOP FOLLOW-UP VISIT: CPT | Performed by: SURGERY

## 2020-10-26 NOTE — PATIENT INSTRUCTIONS

## 2020-10-26 NOTE — LETTER
10/26/20 Patient: Carla Garvey YOB: 1960 Date of Visit: 10/26/2020 Liss Medina MD 
[address] [facility] [address] [address] VIA Facsimile: [phone] Dear Liss Medina MD, Thank you for referring Ms. Carla Garvey to [address] for evaluation. My notes for this consultation are attached. If you have questions, please do not hesitate to call me. I look forward to following your patient along with you. Sincerely, Chiquita Polanco MD

## 2020-10-26 NOTE — PROGRESS NOTES
HISTORY OF PRESENT ILLNESS  Solomon Kingston is a 60 y.o. female. HPI ESTABLISHED patient here POD 13 s/p LEFT breast lumpectomy for DCIS. She is doing well. Not having any pain. Breast cancer-  9/14/2020 - 59 yo female with DCIS left breast Er +   10/13/2020 - LEFT breast lumpectomy. Surg path shows DCIS, margins clear.      Family history-  Sister diagnosed with breast cancer 61. Still living. Two maternal cousins (who were sisters) were diagnosed with breast cancer in their 63's. Both still living.     Breast imaging-  8/2020 - mammogram    FINAL PATHOLOGIC DIAGNOSIS   1. Left breast lateral margin, excision:   Ductal carcinoma in situ, single involved duct   New margin: 0.5 cm   Biopsy site changes   Lobular carcinoma in situ, focal   Fibrocystic changes including usual ductal hyperplasia and benign calcifications   2. Left breast, lumpectomy:   Ductal carcinoma in situ, nuclear grade 2, cribriform and solid pattern with calcifications   Biopsy site   See synoptic report   PENDING DEEPER ANTERIOR MARGIN 2E   3.  Left breast medial margin, excision:   Benign breast showing fibrocystic changes   Negative for carcinoma   DCIS OF THE BREAST: Resection   SPECIMEN   Procedure: Excision (less than total mastectomy)   Specimen Laterality: Left   TUMOR   Tumor Site: Not specified   Histologic Type: Ductal carcinoma in situ   Size (Extent) of DCIS: Cannot be determined: Scattered foci of DCIS   Number of Blocks with DCIS: 4 (specimen #2)   Number of Blocks Examined: 15 (specimen #2)   Architectural Patterns: Solid, Cribriform   Nuclear Grade: Grade II (intermediate)   Necrosis: Not identified   Microcalcifications: Present in DCIS, Present in nonneoplastic tissue   MARGINS   Margins: Uninvolved by DCIS   Distance from Closest Margin (Millimeters): 5 mm   Closest Margin(s): Lateral (specimen #1), Posterior   LYMPH NODES   Regional Lymph Nodes: No lymph nodes submitted or found   PATHOLOGIC STAGE CLASSIFICATION (pTNM, AJCC 8th Edition)   Primary Tumor (pT): pTis (DCIS)   Regional Lymph Nodes (pN): pNX   ADDITIONAL FINDINGS   Additional Findings: Fibrocystic changes and incidental micro-papilloma   SPECIAL STUDIES   Breast Biomarker Testing Performed on Previous Biopsy: Estrogen   Receptor (ER)   Estrogen Receptor (ER) Status: Positive   Percentage of Cells with Nuclear Positivity: %   Testing Performed on Case Number: [id number]     Review of Systems   All other systems reviewed and are negative. Physical Exam  Vitals signs and nursing note reviewed. Chest:          Comments: Left breast incision healing well        ASSESSMENT and PLAN    ICD-10-CM ICD-9-CM    1.  Ductal carcinoma in situ (DCIS) of left breast  D05.12 233.0      - s/p lumpectomy  Additional sections of anterior margin pending   Notified Dr. Papo Armstrong of this  Will refer to med onc rad onc  F/u in 6 months with np

## 2020-10-29 DIAGNOSIS — D05.12 DUCTAL CARCINOMA IN SITU OF LEFT BREAST: Primary | ICD-10-CM

## 2020-11-06 ENCOUNTER — OFFICE VISIT (OUTPATIENT)
Dept: ONCOLOGY | Age: 60
End: 2020-11-06
Payer: MEDICARE

## 2020-11-06 ENCOUNTER — DOCUMENTATION ONLY (OUTPATIENT)
Dept: ONCOLOGY | Age: 60
End: 2020-11-06

## 2020-11-06 VITALS
DIASTOLIC BLOOD PRESSURE: 91 MMHG | HEIGHT: 64 IN | SYSTOLIC BLOOD PRESSURE: 153 MMHG | BODY MASS INDEX: 38.89 KG/M2 | HEART RATE: 64 BPM | WEIGHT: 227.8 LBS | OXYGEN SATURATION: 95 % | TEMPERATURE: 96.9 F

## 2020-11-06 DIAGNOSIS — E11.9 TYPE 2 DIABETES MELLITUS WITHOUT COMPLICATION, WITHOUT LONG-TERM CURRENT USE OF INSULIN (HCC): ICD-10-CM

## 2020-11-06 DIAGNOSIS — E66.01 CLASS 2 SEVERE OBESITY DUE TO EXCESS CALORIES WITH SERIOUS COMORBIDITY AND BODY MASS INDEX (BMI) OF 39.0 TO 39.9 IN ADULT (HCC): ICD-10-CM

## 2020-11-06 DIAGNOSIS — D05.12 DUCTAL CARCINOMA IN SITU (DCIS) OF LEFT BREAST: Primary | ICD-10-CM

## 2020-11-06 PROCEDURE — 99205 OFFICE O/P NEW HI 60 MIN: CPT | Performed by: INTERNAL MEDICINE

## 2020-11-06 RX ORDER — LETROZOLE 2.5 MG/1
2.5 TABLET, FILM COATED ORAL DAILY
Qty: 30 TAB | Refills: 3 | Status: SHIPPED | OUTPATIENT
Start: 2020-11-06 | End: 2021-01-05 | Stop reason: SDUPTHER

## 2020-11-06 NOTE — PATIENT INSTRUCTIONS
Letrozole (Femara®) This information is about a hormonal therapy used to treat breast cancer called letrozole, which is also called Femara®. Throughout this information we refer to it by its more commonly used name, Arleen Mortimer. On this page Femara Aromatase inhibitors How it is taken When it may be given Length of treatment Possible side effects Things to remember about Femara tablets References This information should ideally be read with our general information about breast cancer or secondary breast cancer. Femara Back to top Femara is a type of hormonal therapy used to treat breast cancer in women who have been the through menopause (change of life). You will see your doctor regularly while you have this treatment so they can monitor its effects. Hormonal therapies interfere with the production or action of particular hormones in the body. Hormones are substances produced naturally in the body. They act as chemical messengers and help control the activity of cells and organs. Aromatase inhibitors Back to top Many breast cancers rely on the hormone oestrogen to grow. These cancers are known as hormone-sensitive breast cancers. In women who have had their menopause, the main source of oestrogen is through the conversion of androgens (sex hormones produced by the adrenal glands) into oestrogens. This is carried out by an enzyme called aromatase. The conversion process is known as aromatisation and happens mainly in the fatty tissues of the body. Femara is a drug that blocks the process of aromatisation and so reduces the amount of oestrogen in the body. As less oestrogen reaches the cancer cells, they grow more slowly or stop growing altogether. Drugs that work in this way are known as aromatase inhibitors. Other aromatase inhibitors include anastrozole (Arimidex®) and exemestane (Aromasin®). How it is taken Back to top Femara is a tablet that is taken once a day, ideally at about the same time each day. It doesn't matter whether this is in the morning or the evening. When it may be given Back to top Femara is used to treat postmenopausal women with hormone-sensitive breast cancer. Your doctor will take into account a number of different factors when planning your treatment. Early breast cancer Femara may be given to women with early breast cancer (cancer that hasn't spread) after they have had surgery to remove the cancer. Giving treatment after surgery to reduce the risk of the cancer coming back is known as adjuvant therapy. Hormonal therapy is usually given for five years, but in some situations it may be given for longer. Femara may sometimes be given to women after they have had five years treatment with another hormonal drug called tamoxifen. Sometimes Femara is given before surgery to women with localised early breast cancer, to allow them to have a lumpectomy (removal of the lump) rather than a mastectomy (removal of the breast). Giving treatment before surgery is known as ml-adjuvant therapy. Advanced breast cancer Femara may be used to treat women whose breast cancer has spread to other parts of the body (advanced or metastatic breast cancer). It can also be used to treat women whose cancer has come back after treatment with other hormonal therapies. You might find it helpful to see our information about the staging and grading of breast cancer. Length of treatment Back to top Your doctors will discuss the length of treatment they feel is appropriate for your situation. Femara is often given over a period of years or for as long as it is effective in controlling your cancer, depending on your individual situation. Possible side effects Back to top Each person's reaction to any medicine is different.  Some people have very few side effects while others may experience more. The side effects described here will not affect everyone and may be different if you are having more than one drug. We have outlined the most common side effects but haven't included those that are rare and unlikely to affect you. If you notice any effects that are not listed here, discuss them with your doctor or nurse. You may have some of the following side effects, to varying degrees:  
Hot flushes These are usually mild and may wear off after a period of time. Some people find it helpful to cut down on tea, coffee, nicotine and alcohol. Research suggests that hormones called progestogens or some types of antidepressants may be helpful in controlling this side effect. Your nurse or doctor can discuss this with you. Some people find complementary therapies, such as acupuncture, helpful. Your GP may be able to give you details about having these on the NHS. You can read more about treatments for menopausal symptoms like hot flushes in our information about breast cancer treatment and menopausal symptoms. Feeling sick (nausea) and being sick (vomiting) This can usually be treated effectively, so let your doctor know if it occurs. Feelings of sickness can often be relieved by taking your tablet with food or milk, or at night. Tiredness and headaches It's important to get enough rest. Let your doctor know if you are getting headaches, as medicines can be prescribed to help. You may find our information on fatigue helpful. Muscular aches and joint pain Some women have pain and stiffness in their joints while taking Femara. Let your doctor know if these effects are a problem. You may find it helpful to take mild painkillers. Hair thinning Some women notice that their hair thins while taking Femara, but this is usually mild. Vaginal dryness This may occur while using Femara.  Gels that can help to overcome the dryness are available. These can be bought from a  or be prescribed by your doctor. Risk of osteoporosis Women who have, or are at risk of, osteoporosis (weakened bones), should have their bones assessed before and during treatment with Femara. Some women may need to take bone-strengthening drugs to help prevent osteoporosis from developing. Always let your doctor or nurse know about any side effects you have. There are usually ways in which they can be controlled or improved. Things to remember about Femara tablets Back to top Femara may interact with other medicines. Let your doctor know about any medication you are taking, including non-prescribed drugs such as complementary therapies, vitamins and herbal drugs. Keep the tablets in a safe place, out of the reach of children. Keep the tablets in the original packaging and store them at room temperature, away from direct heat and sunlight. Don't worry if you forget to take your tablet. Do not take a double dose. The levels of the drug in your blood will not change very much, but try not to miss more than one or two tablets in a row. If your doctor decides to stop the treatment, return any remaining tablets to the pharmacist. Garcia Ek not flush them down the toilet or throw them away. Remember to get a new prescription a few weeks before you run out of tablets and make sure that you have plenty for holidays. References Back to top This information is based on our letrozole fact sheet and has been compiled using information from a number of reliable sources, including:  
Nella Turkey: The Complete Drug Reference. 36th edition. 2009. [employer]. [employer]. 59th edition. 2010. [facility] www.medicines. org.uk (accessed September 2010). Early and locally-advanced breast cancer: diagnosis and treatment. 2009. [employer] (NICE).

## 2020-11-06 NOTE — PROGRESS NOTES
Oncology Social Work  Psychosocial Assessment    Reason for Assessment:      [] Social Work Referral [x] Initial Assessment [] New Diagnosis [] Other    [facility] [address] 10/5/2020   Confirm Advance Directive None   Patient Would Like to Complete Advance Directive No   Does the patient have other document types (No Data)   Patient does not have an advanced medical directive, and did not express interest in completing one today. Sources of Information:    [x]Patient  []Family  []Staff  []Medical Record    Mental Status:    [x]Alert  []Lethargic  []Unresponsive   [] Unable to assess   Oriented to:  [x]Person  [x]Place  [x]Time  [x]Situation      Relationship Status:  []Single  []  []Significant Other/Life Partner  [x]  []  []    Living Circumstances:  []Lives Alone  []Family/Significant Other in Household  [x]Roommates  []Children in the Home  []Paid Caregivers  []Assisted Living Facility/Group Home  []Skilled [address]  []Homeless  []Incarcerated  []Environmental/Care Concerns  []Other:    Employment Status:  []Employed Full-time []Employed Part-time []Homemaker  [] Retired [] Short-Term Disability [x] [facility]  [] Unemployed     Barriers to Learning:    []Language  []Developmental  []Cognitive  []Altered Mental Status  []Visual/Hearing Impairment  []Unable to Read/Write  []Motivational  [] Challenges Understanding Medical Jargon [x]No Barriers Identified      Financial/Legal Concerns:    []Uninsured  [x]Limited Income/Resources  []Non-Citizen  []Food Insecurity []No Concerns Identified   []Other:    Sabianism/Spiritual/Existential:  Does patient have any spiritual or Restorationism beliefs? [] Yes [] No  Is the patient involved in a spiritual, anni or Restorationism community? [] Yes [] No  Patient expressing spiritual/existential angst? [] Yes [] No  Notes: Patient did not express any spiritual or Restorationism preferences today.       Support System:    Identified Support Person/Group:  [x]Strong  []Fair  []Limited    Coping with Illness:   [x]  Coping Well  [] Challenges Coping with Serious Illness [] Situational Depression [] Situational Anxiety [] Anticipatory Grief  [] Recent Loss [] Caregiver [place]            Narrative:   Met with the patient during her initial office visit today. Patient is being seen for Left breast DCIS. Patient is status post lumpectomy on 10/13/2020. Patient lives in a boarding house, sharing her portion of the space with one other person. She mentioned challenges with leaks in the ceiling, and rotting wood on the porch column, that have yet to be addressed by her landlord. Explained that this  can send a referral to [facility], to connect her with a , with her permission. Patient is agreeable to this plan. The patient is  and does not have children. She has two brothers and one sister who live locally and are supportive. Her sister provides transportation to the grocery store each week. 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 She hurt her foot, and has been on disability since 2015. She receives $1,088/month in disability income. The patient has a PCP - Dr. Pineda Drivers with [place] . She gets transportation benefits through [facility] [employer]. Provided this 's contact information as well as information regarding the complementary services provided by the [facility], explaining that the center is currently closed due to COVID-19 restrictions. Explained that meditation and music therapy are both being offered virtually. Plan:   1. Introduced self and role of this  in the [address] [place] [address] Dr. 2.  Informed the patient of the [facility] and available resources there.     3.  Continue to meet with the patient when she returns to the clinic for ongoing assessment of the patients adjustment to her diagnosis and treatment. 4.  Ongoing psychosocial support as desired by patient.       Referral:   Legal referral  Jacob French LCSW

## 2020-11-06 NOTE — PROGRESS NOTES
[facility] [place] at [address], [address] [place]: [phone]  F: [phone]    Reason for Visit:   Diandra Smith is a 60 y.o. female who is seen in consultation at the request of Dr. Adina Jamison for evaluation of Left breast DCIS. Treatment History:   · 10/13/2020: Left breast lumpectomy- DCIS- Grade 2, focal LCIS, negative margins, ER > 100%    History of Present Illness:   Patient is a 60 y.o. female seen for L breast DCIS    She was found to have suspicious calcifications on a mammogram done 2020. Subsequent bx on 2020 showed DCIS. She underwent a lumpectomy on 10/13/2020 and seen for further management. She is recovering well, has minimal pain, no fevers, chills, sweats, SOB,cough, has no falls, has no changes in weight or appetite, she has no neuropathy.  DM and HTN are controlled    Sister had breast cancer , 2 cousins had breast cancer   Another sister had colon cancer     Past Medical History:   Diagnosis Date    CAD (coronary artery disease)     Cancer ([address]     LEFT BREAST    Diabetes ([facility]     Hypertension       Past Surgical History:   Procedure Laterality Date    CARDIAC SURG PROCEDURE UNLIST      4 STENTS IN [facility] AND 5 STENTS IN [facility]    HX BREAST LUMPECTOMY Left 10/13/2020    LEFT BREAST LUMPECTOMY WITH ULTRASOUND AND MARGIN PROBE performed by Guy Tucker MD at [facility] AMBULATORY OR    HX GI      COLONOSCOPY    HX ORTHOPAEDIC Right     REPAIR OF FLAT FOOT      Social History     Tobacco Use    Smoking status: Former Smoker     Packs/day: 0.25     Years: 16.00     Pack years: 4.00     Last attempt to quit: 10/5/2018     Years since quittin.0    Smokeless tobacco: Never Used   Substance Use Topics    Alcohol use: Not Currently      Family History   Problem Relation Age of Onset    Breast Cancer Sister 72    Breast Cancer Cousin     Breast Cancer Cousin     No Known Problems Mother     Stroke Father  Asthma Father     Diabetes Brother     Diabetes Sister     Cancer Sister         COLON    No Known Problems Brother     Dementia Brother     Anesth Problems Neg Hx      Current Outpatient Medications   Medication Sig    ondansetron (ZOFRAN ODT) 4 mg disintegrating tablet Take 1 Tab by mouth every eight (8) hours as needed for Nausea or Vomiting.  amLODIPine-benazepril (LOTREL) 10-20 mg per capsule Take 1 Cap by mouth daily.  atorvastatin (LIPITOR) 40 mg tablet Take 40 mg by mouth daily.  hydroCHLOROthiazide (MICROZIDE) 12.5 mg capsule Take 12.5 mg by mouth daily.  metFORMIN (GLUCOPHAGE) 1,000 mg tablet 1,000 mg two (2) times a day.  metoprolol tartrate (LOPRESSOR) 25 mg tablet Take 25 mg by mouth daily. No current facility-administered medications for this visit. No Known Allergies     Review of Systems: A complete review of systems was obtained, negative except as described above. Physical Exam:     Visit Vitals  BP (!) 153/91   Pulse 64   Temp 96.9 °F (36.1 °C)   Ht 5' 4\" (1.626 m)   Wt 227 lb 12.8 oz (103.3 kg)   SpO2 95%   BMI 39.10 kg/m²     ECOG PS:1`  General: No distress  Eyes: PERRLA, anicteric sclerae  HENT: Atraumatic  Neck: Supple  Lymphatic: No cervical, supraclavicular, or inguinal adenopathy  Respiratory: normal respiratory effort  Breast: Limited exam- left breast incision healed  CV: Normal rate, no peripheral edema  GI: Soft, nontender, nondistended, no masses, no hepatomegaly, no splenomegaly  MS: Normal gait and station. Digits without clubbing or cyanosis. Skin: No rashes, ecchymoses, or petechiae. Normal temperature, turgor, and texture. Psych: Alert, oriented, appropriate affect, normal judgment/insight    Results:     Lab Results   Component Value Date/Time    WBC 5.5 10/05/2020 09:44 AM    HGB 10.7 (L) 10/05/2020 09:44 AM    HCT 35.4 10/05/2020 09:44 AM    PLATELET 008 (H) 27/79/7572 09:44 AM    MCV 90.1 10/05/2020 09:44 AM    ABS.  NEUTROPHILS 2.8 10/05/2020 09:44 AM     Lab Results   Component Value Date/Time    Sodium 141 10/05/2020 09:44 AM    Potassium 3.9 10/05/2020 09:44 AM    Chloride 108 10/05/2020 09:44 AM    CO2 29 10/05/2020 09:44 AM    Glucose 128 (H) 10/05/2020 09:44 AM    BUN 21 (H) 10/05/2020 09:44 AM    Creatinine 1.39 (H) 10/05/2020 09:44 AM    GFR est AA 47 (L) 10/05/2020 09:44 AM    GFR est non-AA 39 (L) 10/05/2020 09:44 AM    Calcium 9.1 10/05/2020 09:44 AM    Glucose (POC) 145 (H) 10/13/2020 10:09 AM     No results found for: TBILI, ALT, AP, TP, ALB, GLOB      Records reviewed and summarized above. Pathology report(s) reviewed above. DCIS OF THE BREAST: Resection   SPECIMEN   Procedure: Excision (less than total mastectomy)   Specimen Laterality: Left   TUMOR   Tumor Site: Not specified   Histologic Type: Ductal carcinoma in situ   Size (Extent) of DCIS: Cannot be determined: Scattered foci of DCIS   Number of Blocks with DCIS: 4 (specimen #2)   Number of Blocks Examined: 15 (specimen #2)   Architectural Patterns: Solid, Cribriform   Nuclear Grade: Grade II (intermediate)   Necrosis: Not identified   Microcalcifications: Present in DCIS, Present in nonneoplastic tissue   MARGINS   Margins: Uninvolved by DCIS   Distance from Closest Margin (Millimeters): 5 mm   Closest Margin(s): Lateral (specimen #1), Posterior   LYMPH NODES   Regional Lymph Nodes: No lymph nodes submitted or found   PATHOLOGIC STAGE CLASSIFICATION (pTNM, AJCC 8th Edition)   Primary Tumor (pT): pTis (DCIS)   Regional Lymph Nodes (pN): pNX         Radiology report(s) reviewed above. Assessment:   1) Left breast DCIS    S/P Lumpectomy 10/13/2020  Scattered foci ( T0) , concurrent LCIS  Grade 2  %    The risk of invasive+ non invasive events following a diagnosis of DCIS is about 15-50%, half of these events are invasive, risk of contralateral disease is 3-10%.  BCT alone is associated with a risk of recurrence in the order of 20- 30% which is decreased by about 50% with the addition of XRT (NSABP B-17). Addition of endocrine therapy in HR+ DCIS further decreases the risk of recurrent breast events by about 30-40% (including contralateral breast events). It was reiterated that neither modality is shown to improve OS. Side effects of AIs reviewed  may lead to myalgias, arthralgias, osteoporosis / fractures and cardiovascular disease. She is to meet with Dr. Esteban Mclaughlin and if she decides to receive RT will start endocrine therapy after completion of RT    2) DMII    3) HTN    4) CAD    5) Obesity    6) Psychosocial    Prognosis: Good     This is our best current assessment. Cancers respond differently to treatment. Overall prognosis depends on many factors including other conditions, cancer stage, side effects, and other unforeseen events. Goal of therapy: Curative     Expected response to treatment:  Very good: Anticipate remission (no sign of cancer) and possible cancer cure    Treatment benefits and harms:  We discussed potential short term side effects to include:fatigue  and joint pain, hot flashes    Long term side effects of treatment:  decreased bone density and risk of fractures    Quality of life: Quality of life concerns have been addressed. Treatment as outlined is expected to have minimal impact on patients quality of life. Plan:     · Follow with Rad Onc as scheduled  · VD and Ca   · Start Letrozole daily for 5 years  · Discuss genetic testing next visit  · RTC 3 months    I appreciate the opportunity to participate in Ms. Byron Love's care.     Signed By: Teodora Schaefer MD

## 2020-11-06 NOTE — PROGRESS NOTES
Ashley Hernández is a 60 y.o. female  Chief Complaint   Patient presents with    New Patient    Breast Cancer     1. Have you been to the ER, urgent care clinic since your last visit? Hospitalized since your last visit? No  2. Have you seen or consulted any other health care providers outside of the [address] [place] [address] since your last visit? Include any pap smears or colon screening.  No

## 2020-12-03 ENCOUNTER — APPOINTMENT (OUTPATIENT)
Dept: RADIATION THERAPY | Age: 60
End: 2020-12-03

## 2020-12-04 ENCOUNTER — HOSPITAL ENCOUNTER (OUTPATIENT)
Dept: RADIATION THERAPY | Age: 60
Discharge: HOME | End: 2020-12-04

## 2021-01-11 ENCOUNTER — APPOINTMENT (OUTPATIENT)
Dept: CT IMAGING | Age: 61
End: 2021-01-11
Attending: EMERGENCY MEDICINE
Payer: MEDICARE

## 2021-01-11 ENCOUNTER — HOSPITAL ENCOUNTER (EMERGENCY)
Age: 61
Discharge: HOME | End: 2021-01-11
Attending: STUDENT IN AN ORGANIZED HEALTH CARE EDUCATION/TRAINING PROGRAM
Payer: MEDICARE

## 2021-01-11 VITALS
HEART RATE: 80 BPM | HEIGHT: 64 IN | DIASTOLIC BLOOD PRESSURE: 70 MMHG | BODY MASS INDEX: 38.76 KG/M2 | TEMPERATURE: 98 F | RESPIRATION RATE: 16 BRPM | OXYGEN SATURATION: 95 % | WEIGHT: 227 LBS | SYSTOLIC BLOOD PRESSURE: 122 MMHG

## 2021-01-11 DIAGNOSIS — R11.10 VOMITING, INTRACTABILITY OF VOMITING NOT SPECIFIED, PRESENCE OF NAUSEA NOT SPECIFIED, UNSPECIFIED VOMITING TYPE: ICD-10-CM

## 2021-01-11 DIAGNOSIS — R19.7 DIARRHEA, UNSPECIFIED TYPE: Primary | ICD-10-CM

## 2021-01-11 DIAGNOSIS — Z91.89 AT INCREASED RISK OF EXPOSURE TO COVID-19 VIRUS: ICD-10-CM

## 2021-01-11 LAB
ALBUMIN SERPL-MCNC: 3.9 G/DL (ref 3.5–5)
ALBUMIN/GLOB SERPL: 0.9 {RATIO} (ref 1.1–2.2)
ALP SERPL-CCNC: 115 U/L (ref 45–117)
ALT SERPL-CCNC: 14 U/L (ref 12–78)
ANION GAP SERPL CALC-SCNC: 4 MMOL/L (ref 5–15)
APPEARANCE UR: ABNORMAL
AST SERPL-CCNC: 11 U/L (ref 15–37)
BACTERIA URNS QL MICRO: NEGATIVE /HPF
BASOPHILS # BLD: 0 K/UL (ref 0–0.1)
BASOPHILS NFR BLD: 0 % (ref 0–1)
BILIRUB SERPL-MCNC: 0.7 MG/DL (ref 0.2–1)
BILIRUB UR QL: NEGATIVE
BUN SERPL-MCNC: 31 MG/DL (ref 6–20)
BUN/CREAT SERPL: 18 (ref 12–20)
CALCIUM SERPL-MCNC: 9.6 MG/DL (ref 8.5–10.1)
CHLORIDE SERPL-SCNC: 102 MMOL/L (ref 97–108)
CO2 SERPL-SCNC: 31 MMOL/L (ref 21–32)
COLOR UR: ABNORMAL
COMMENT, HOLDF: NORMAL
CREAT SERPL-MCNC: 1.74 MG/DL (ref 0.55–1.02)
DIFFERENTIAL METHOD BLD: NORMAL
EOSINOPHIL # BLD: 0.1 K/UL (ref 0–0.4)
EOSINOPHIL NFR BLD: 2 % (ref 0–7)
EPITH CASTS URNS QL MICRO: ABNORMAL /LPF
ERYTHROCYTE [DISTWIDTH] IN BLOOD BY AUTOMATED COUNT: 13.7 % (ref 11.5–14.5)
GLOBULIN SER CALC-MCNC: 4.2 G/DL (ref 2–4)
GLUCOSE SERPL-MCNC: 136 MG/DL (ref 65–100)
GLUCOSE UR STRIP.AUTO-MCNC: NEGATIVE MG/DL
HCT VFR BLD AUTO: 41.5 % (ref 35–47)
HGB BLD-MCNC: 13.2 G/DL (ref 11.5–16)
HGB UR QL STRIP: NEGATIVE
IMM GRANULOCYTES # BLD AUTO: 0 K/UL
IMM GRANULOCYTES NFR BLD AUTO: 0 %
KETONES UR QL STRIP.AUTO: ABNORMAL MG/DL
LEUKOCYTE ESTERASE UR QL STRIP.AUTO: NEGATIVE
LIPASE SERPL-CCNC: 236 U/L (ref 73–393)
LYMPHOCYTES # BLD: 1.2 K/UL (ref 0.8–3.5)
LYMPHOCYTES NFR BLD: 33 % (ref 12–49)
MAGNESIUM SERPL-MCNC: 2.4 MG/DL (ref 1.6–2.4)
MCH RBC QN AUTO: 27.2 PG (ref 26–34)
MCHC RBC AUTO-ENTMCNC: 31.8 G/DL (ref 30–36.5)
MCV RBC AUTO: 85.4 FL (ref 80–99)
MONOCYTES # BLD: 0.4 K/UL (ref 0–1)
MONOCYTES NFR BLD: 10 % (ref 5–13)
NEUTS BAND NFR BLD MANUAL: 1 % (ref 0–6)
NEUTS SEG # BLD: 2 K/UL (ref 1.8–8)
NEUTS SEG NFR BLD: 54 % (ref 32–75)
NITRITE UR QL STRIP.AUTO: NEGATIVE
NRBC # BLD: 0 K/UL (ref 0–0.01)
NRBC BLD-RTO: 0 PER 100 WBC
PH UR STRIP: 5 [PH] (ref 5–8)
PLATELET # BLD AUTO: 368 K/UL (ref 150–400)
PMV BLD AUTO: 10.8 FL (ref 8.9–12.9)
POTASSIUM SERPL-SCNC: 3.6 MMOL/L (ref 3.5–5.1)
PROT SERPL-MCNC: 8.1 G/DL (ref 6.4–8.2)
PROT UR STRIP-MCNC: 30 MG/DL
RBC # BLD AUTO: 4.86 M/UL (ref 3.8–5.2)
RBC #/AREA URNS HPF: ABNORMAL /HPF (ref 0–5)
RBC MORPH BLD: NORMAL
SAMPLES BEING HELD,HOLD: NORMAL
SODIUM SERPL-SCNC: 137 MMOL/L (ref 136–145)
SP GR UR REFRACTOMETRY: 1.02 (ref 1–1.03)
UR CULT HOLD, URHOLD: NORMAL
UROBILINOGEN UR QL STRIP.AUTO: 0.2 EU/DL (ref 0.2–1)
WBC # BLD AUTO: 3.7 K/UL (ref 3.6–11)
WBC MORPH BLD: NORMAL
WBC URNS QL MICRO: ABNORMAL /HPF (ref 0–4)

## 2021-01-11 PROCEDURE — 74011250636 HC RX REV CODE- 250/636: Performed by: EMERGENCY MEDICINE

## 2021-01-11 PROCEDURE — 96361 HYDRATE IV INFUSION ADD-ON: CPT

## 2021-01-11 PROCEDURE — 36415 COLL VENOUS BLD VENIPUNCTURE: CPT

## 2021-01-11 PROCEDURE — 96375 TX/PRO/DX INJ NEW DRUG ADDON: CPT

## 2021-01-11 PROCEDURE — 83690 ASSAY OF LIPASE: CPT

## 2021-01-11 PROCEDURE — 87635 SARS-COV-2 COVID-19 AMP PRB: CPT

## 2021-01-11 PROCEDURE — 80053 COMPREHEN METABOLIC PANEL: CPT

## 2021-01-11 PROCEDURE — 74011000250 HC RX REV CODE- 250: Performed by: EMERGENCY MEDICINE

## 2021-01-11 PROCEDURE — 99284 EMERGENCY DEPT VISIT MOD MDM: CPT

## 2021-01-11 PROCEDURE — 81001 URINALYSIS AUTO W/SCOPE: CPT

## 2021-01-11 PROCEDURE — 74176 CT ABD & PELVIS W/O CONTRAST: CPT

## 2021-01-11 PROCEDURE — 85025 COMPLETE CBC W/AUTO DIFF WBC: CPT

## 2021-01-11 PROCEDURE — 83735 ASSAY OF MAGNESIUM: CPT

## 2021-01-11 PROCEDURE — 96374 THER/PROPH/DIAG INJ IV PUSH: CPT

## 2021-01-11 RX ORDER — ONDANSETRON 4 MG/1
4 TABLET, ORALLY DISINTEGRATING ORAL
Qty: 20 TAB | Refills: 0 | Status: SHIPPED | OUTPATIENT
Start: 2021-01-11

## 2021-01-11 RX ORDER — ONDANSETRON 2 MG/ML
4 INJECTION INTRAMUSCULAR; INTRAVENOUS
Status: COMPLETED | OUTPATIENT
Start: 2021-01-11 | End: 2021-01-11

## 2021-01-11 RX ADMIN — FAMOTIDINE 20 MG: 10 INJECTION, SOLUTION INTRAVENOUS at 10:48

## 2021-01-11 RX ADMIN — ONDANSETRON 4 MG: 2 INJECTION INTRAMUSCULAR; INTRAVENOUS at 10:48

## 2021-01-11 RX ADMIN — SODIUM CHLORIDE 1000 ML: 9 INJECTION, SOLUTION INTRAVENOUS at 10:48

## 2021-01-11 NOTE — ED TRIAGE NOTES
Patient arrives from home for N/V/D for the past four days. Patient is currently being treated with oral chemo for breast CA. Reports n/v/d began before taking the chemo pill on Friday.

## 2021-01-11 NOTE — ED PROVIDER NOTES
HPI patient is a 56-year-old female with past medical history significant for coronary artery disease, left breast cancer, type 2 diabetes, and hypertension who presents to the ED with reports of intermittent episodic nonbloody vomiting and diarrhea for 1 week. She states in the past 2 days she has not been able to keep down anything but sips of water. She was brought in by EMS today. She was started on Letrozole on Friday by Dr. Rosa Johnson (oncology) but has not been able to keep the medication down. Denies fever, cold symptoms, headache, neck pain, visual changes, focal weakness or rash. Denies any  difficulty breathing, difficulty swallowing, SOB or chest pain. Denies any urinary symptoms.  Pt. Reports she has not had any food or medications today prior to arrival.        Past Medical History:   Diagnosis Date    CAD (coronary artery disease)     Cancer ([facility] 2020    LEFT BREAST    Diabetes ([facility]     Hypertension        Past Surgical History:   Procedure Laterality Date    HX BREAST LUMPECTOMY Left 10/13/2020    LEFT BREAST LUMPECTOMY WITH ULTRASOUND AND MARGIN PROBE performed by Yosef Reid MD at [address] [facility] GI      COLONOSCOPY    HX ORTHOPAEDIC Right 2015    REPAIR OF FLAT FOOT    [facility] CARDIAC SURG PROCEDURE UNLIST  2009    4 STENTS IN [facility] AND 5 STENTS IN [facility]         Family History:   Problem Relation Age of Onset    Breast Cancer Sister 72    Breast Cancer Cousin     Breast Cancer Cousin     No Known Problems Mother     Stroke Father     Asthma Father     Diabetes Brother     Diabetes Sister     Cancer Sister         COLON    No Known Problems Brother     Dementia Brother     Anesth Problems Neg Hx        Social History     Socioeconomic History    Marital status:      Spouse name: Not on file    Number of children: Not on file    Years of education: Not on file    Highest education level: Not on file   Occupational History    Not on file   Social Needs    Financial resource strain: Not on file    Food insecurity     Worry: Not on file     Inability: Not on file    Transportation needs     Medical: Not on file     Non-medical: Not on file   Tobacco Use    Smoking status: Former Smoker     Packs/day: 0.25     Years: 16.00     Pack years: 4.00     Quit date: 10/5/2018     Years since quittin.2    Smokeless tobacco: Never Used   Substance and Sexual Activity    Alcohol use: Not Currently    Drug use: Never    Sexual activity: Not on file   Lifestyle    Physical activity     Days per week: Not on file     Minutes per session: Not on file    Stress: Not on file   Relationships    Social connections     Talks on phone: Not on file     Gets together: Not on file     Attends Sabianism service: Not on file     Active member of club or organization: Not on file     Attends meetings of clubs or organizations: Not on file     Relationship status: Not on file    Intimate partner violence     Fear of current or ex partner: Not on file     Emotionally abused: Not on file     Physically abused: Not on file     Forced sexual activity: Not on file   Other Topics Concern    Not on file   Social History Narrative    Not on file         ALLERGIES: Patient has no known allergies. Review of Systems   Constitutional: Positive for appetite change. Negative for activity change, fever and unexpected weight change. HENT: Negative for congestion, rhinorrhea, sore throat and trouble swallowing. Eyes: Negative for visual disturbance. Respiratory: Negative for cough and shortness of breath. Cardiovascular: Negative for chest pain, palpitations and leg swelling. Gastrointestinal: Positive for diarrhea, nausea and vomiting. Negative for abdominal pain. Genitourinary: Negative for difficulty urinating and dysuria. Musculoskeletal: Negative for back pain and neck pain. Neurological: Negative for headaches. All other systems reviewed and are negative.       Vitals: 01/11/21 1002   BP: 122/70   Pulse: 80   Resp: 16   Temp: 98 °F (36.7 °C)   SpO2: 95%   Weight: 103 kg (227 lb)   Height: 5' 4\" (1.626 m)            Physical Exam  Vitals signs and nursing note reviewed. Constitutional:       General: She is not in acute distress. Appearance: She is obese. She is not ill-appearing, toxic-appearing or diaphoretic. Comments: Black female; former smoker; lives in a boarding house   HENT:      Head: Normocephalic. Mouth/Throat:      Mouth: Mucous membranes are moist.   Neck:      Musculoskeletal: Normal range of motion and neck supple. Cardiovascular:      Rate and Rhythm: Normal rate and regular rhythm. Pulmonary:      Effort: Pulmonary effort is normal.      Breath sounds: Normal breath sounds. Abdominal:      General: Bowel sounds are normal.      Palpations: Abdomen is soft. Musculoskeletal: Normal range of motion. Right lower leg: No edema. Left lower leg: No edema. Skin:     General: Skin is warm and dry. Findings: No rash. Neurological:      General: No focal deficit present. Mental Status: She is alert and oriented to person, place, and time. MDM       Procedures    Dr. Alberto Jose (PCP with [facility]) was updated on available lab results and plan of care; she will follow up with the patient tomorrow in her office. Ct Abd Pelv Wo Cont    Result Date: 1/11/2021  IMPRESSION: 3 mm stone is noted within the right renal pelvis without hydronephrosis. Bilateral nonobstructing renal stones. Colonic diverticulosis. No acute abnormality.     Labs Reviewed   METABOLIC PANEL, COMPREHENSIVE - Abnormal; Notable for the following components:       Result Value    Anion gap 4 (*)     Glucose 136 (*)     BUN 31 (*)     Creatinine 1.74 (*)     GFR est AA 36 (*)     GFR est non-AA 30 (*)     AST (SGOT) 11 (*)     Globulin 4.2 (*)     A-G Ratio 0.9 (*)     All other components within normal limits   URINALYSIS W/MICROSCOPIC - Abnormal; Notable for the following components:    Appearance CLOUDY (*)     Protein 30 (*)     Ketone TRACE (*)     All other components within normal limits   URINE CULTURE HOLD SAMPLE   SAMPLES BEING HELD   CBC WITH AUTOMATED DIFF   LIPASE   MAGNESIUM   SARS-COV-2     Tristan Carballo was evaluated in the Emergency Department on 1/11/2021 for the symptoms described in the history of present illness. He/she was evaluated in the context of the global COVID-19 pandemic, which necessitated consideration that the patient might be at risk for infection with the SARS-CoV-2 virus that causes COVID-19. Institutional protocols and algorithms that pertain to the evaluation of patients at risk for COVID-19 are in a state of rapid change based on information released by regulatory bodies including the CDC and federal and state organizations. These policies and algorithms were followed during the patient's care in the ED. Surrogate Decision Maker (Who do you want to make decisions for you in the event you are not able to?): Extended Emergency Contact Information  Primary Emergency Contact: [address] Phone: [phone]  Mobile Phone: [phone]  Relation: Sister    Ventilation (Do you want to be intubated and mechanically ventilated?):  Yes    CPR (Do you want chest compressions and electricity in an attempt to restart your heart?): Yes      Patient has been reexamined and reports slight improvement from medications given along with IV fluids. She has not had any further diarrhea or vomiting in the ED. Plan to discharge home on short course of Zofran ODT with close follow-up with her PCP.  4:24 PM  Patient's results and plan of care have been reviewed with her.   Patient has verbally conveyed her understanding and agreement of her signs, symptoms, diagnosis, treatment and prognosis and additionally agrees to follow up as recommended or return to the Emergency Room should her condition change prior to follow-up. Discharge instructions have also been provided to the patient with some educational information regarding her diagnosis as well a list of reasons why she would want to return to the ER prior to her follow-up appointment should her condition change. Ulysses Peels, NP

## 2021-01-12 LAB
COVID-19, XGCOVT: DETECTED
HEALTH STATUS, XMCV2T: ABNORMAL
SOURCE, COVRS: ABNORMAL
SPECIMEN SOURCE, FCOV2M: ABNORMAL
SPECIMEN TYPE, XMCV1T: ABNORMAL

## 2021-01-15 NOTE — PROGRESS NOTES
I had received lab call regarding positive COVID test result. NIC Whaley has already been in contact with the patient and discussed this with her. As such, no further contact was made on my part. Betina Moore  1/15/2021    I was personally available for consultation in the emergency department. I have reviewed the chart and agree with the documentation recorded by the [facility], including the assessment, treatment plan, and disposition.   Cesario Olivera MD

## 2021-02-09 ENCOUNTER — OFFICE VISIT (OUTPATIENT)
Dept: ONCOLOGY | Age: 61
End: 2021-02-09
Payer: MEDICARE

## 2021-02-09 VITALS
HEART RATE: 62 BPM | HEIGHT: 64 IN | DIASTOLIC BLOOD PRESSURE: 69 MMHG | OXYGEN SATURATION: 98 % | RESPIRATION RATE: 17 BRPM | SYSTOLIC BLOOD PRESSURE: 107 MMHG | TEMPERATURE: 96.8 F | BODY MASS INDEX: 36.54 KG/M2 | WEIGHT: 214 LBS

## 2021-02-09 DIAGNOSIS — E88.09 AROMATASE DEFICIENCY IN FEMALE: ICD-10-CM

## 2021-02-09 DIAGNOSIS — D05.12 DUCTAL CARCINOMA IN SITU (DCIS) OF LEFT BREAST: Primary | ICD-10-CM

## 2021-02-09 PROCEDURE — G8510 SCR DEP NEG, NO PLAN REQD: HCPCS | Performed by: INTERNAL MEDICINE

## 2021-02-09 PROCEDURE — G8427 DOCREV CUR MEDS BY ELIG CLIN: HCPCS | Performed by: INTERNAL MEDICINE

## 2021-02-09 PROCEDURE — G8417 CALC BMI ABV UP PARAM F/U: HCPCS | Performed by: INTERNAL MEDICINE

## 2021-02-09 PROCEDURE — 3017F COLORECTAL CA SCREEN DOC REV: CPT | Performed by: INTERNAL MEDICINE

## 2021-02-09 PROCEDURE — 99213 OFFICE O/P EST LOW 20 MIN: CPT | Performed by: INTERNAL MEDICINE

## 2021-02-09 PROCEDURE — G9899 SCRN MAM PERF RSLTS DOC: HCPCS | Performed by: INTERNAL MEDICINE

## 2021-02-09 NOTE — PROGRESS NOTES
[facility], [id number] [place] [address] Road, [facility]: [phone]  F: [phone]    Reason for Visit:   Jude Smith is a 63 y.o. female who is seen for Left breast DCIS. Treatment History:   · 10/13/2020: Left breast lumpectomy- DCIS- Grade 2, focal LCIS, negative margins, ER > 100%  · No RT, started Letrozole    History of Present Illness:   Patient is a 63 y.o. female seen for L breast DCIS    She was found to have suspicious calcifications on a mammogram done 2020. Subsequent bx on 2020 showed DCIS. She underwent a lumpectomy on 10/13/2020 , underwent RT and started Letrozole. She was in the ER 2021 with nausea and diarrhea. CT abdomen showed renal calculi and colonic diverticulosis. She states that she thinks that she ate \" bad fish\" when she had the episode. She has no belly pain  She is taking Letrozole, she has rare hot flashes, no new aches or pain. She feels great! Has no fevers, chills, sweats, SOB,cough, has no falls. DM and HTN are controlled.   She had met with Rad Onc and RT was not recommended by Dr. Katty Macias     Sister had breast cancer , 2 cousins had breast cancer   Another sister had colon cancer     Past Medical History:   Diagnosis Date    CAD (coronary artery disease)     Cancer ([address]     LEFT BREAST    [facility]     Hypertension       Past Surgical History:   Procedure Laterality Date    HX BREAST LUMPECTOMY Left 10/13/2020    LEFT BREAST LUMPECTOMY WITH ULTRASOUND AND MARGIN PROBE performed by Khadijah Vega MD at [address] HX GI      COLONOSCOPY    HX ORTHOPAEDIC Right     REPAIR OF FLAT FOOT    [facility] CARDIAC SURG PROCEDURE UNLIST      4 STENTS IN [facility] AND 5 STENTS IN  [place]      Social History     Tobacco Use    Smoking status: Former Smoker     Packs/day: 0.25     Years: 16.00     Pack years: 4.00     Quit date: 10/5/2018     Years since quittin.3    Smokeless tobacco: Never Used   Substance Use Topics    Alcohol use: Not Currently      Family History   Problem Relation Age of Onset    Breast Cancer Sister 72    Breast Cancer Cousin     Breast Cancer Cousin     No Known Problems Mother     Stroke Father     Asthma Father     Diabetes Brother     Diabetes Sister     Cancer Sister         COLON    No Known Problems Brother     Dementia Brother     Anesth Problems Neg Hx      Current Outpatient Medications   Medication Sig    ondansetron (Zofran ODT) 4 mg disintegrating tablet 1 Tab by SubLINGual route every eight (8) hours as needed for Nausea or Vomiting.  letrozole (Femara) 2.5 mg tablet Take 1 Tab by mouth daily.  amLODIPine-benazepril (LOTREL) 10-20 mg per capsule Take 1 Cap by mouth daily.  atorvastatin (LIPITOR) 40 mg tablet Take 40 mg by mouth daily.  hydroCHLOROthiazide (MICROZIDE) 12.5 mg capsule Take 12.5 mg by mouth daily.  metFORMIN (GLUCOPHAGE) 1,000 mg tablet 1,000 mg two (2) times a day.  metoprolol tartrate (LOPRESSOR) 25 mg tablet Take 25 mg by mouth daily. No current facility-administered medications for this visit. No Known Allergies     Review of Systems: A complete review of systems was obtained, negative except as described above. Physical Exam:     Visit Vitals  /69   Pulse 62   Temp 96.8 °F (36 °C) (Temporal)   Resp 17   Ht 5' 4\" (1.626 m)   Wt 214 lb (97.1 kg)   SpO2 98%   BMI 36.73 kg/m²     ECOG PS:1`  General: No distress  Eyes: PERRLA, anicteric sclerae  HENT: Atraumatic  Neck: Supple  Breast: Limited exam- left breast incision healed  Skin: No rashes, ecchymoses, or petechiae. Normal temperature, turgor, and texture.   Psych: Alert, oriented, appropriate affect, normal judgment/insight    Results:     Lab Results   Component Value Date/Time    WBC 3.7 01/11/2021 10:10 AM    HGB 13.2 01/11/2021 10:10 AM    HCT 41.5 01/11/2021 10:10 AM    PLATELET 993 87/73/3153 10:10 AM    MCV 85.4 01/11/2021 10:10 AM ABS. NEUTROPHILS 2.0 01/11/2021 10:10 AM     Lab Results   Component Value Date/Time    Sodium 137 01/11/2021 10:10 AM    Potassium 3.6 01/11/2021 10:10 AM    Chloride 102 01/11/2021 10:10 AM    CO2 31 01/11/2021 10:10 AM    Glucose 136 (H) 01/11/2021 10:10 AM    BUN 31 (H) 01/11/2021 10:10 AM    Creatinine 1.74 (H) 01/11/2021 10:10 AM    GFR est AA 36 (L) 01/11/2021 10:10 AM    GFR est non-AA 30 (L) 01/11/2021 10:10 AM    Calcium 9.6 01/11/2021 10:10 AM    Glucose (POC) 145 (H) 10/13/2020 10:09 AM     Lab Results   Component Value Date/Time    Bilirubin, total 0.7 01/11/2021 10:10 AM    ALT (SGPT) 14 01/11/2021 10:10 AM    Alk. phosphatase 115 01/11/2021 10:10 AM    Protein, total 8.1 01/11/2021 10:10 AM    Albumin 3.9 01/11/2021 10:10 AM    Globulin 4.2 (H) 01/11/2021 10:10 AM         Records reviewed and summarized above. Pathology report(s) reviewed above. DCIS OF THE BREAST: Resection   SPECIMEN   Procedure: Excision (less than total mastectomy)   Specimen Laterality: Left   TUMOR   Tumor Site: Not specified   Histologic Type: Ductal carcinoma in situ   Size (Extent) of DCIS: Cannot be determined: Scattered foci of DCIS   Number of Blocks with DCIS: 4 (specimen #2)   Number of Blocks Examined: 15 (specimen #2)   Architectural Patterns: Solid, Cribriform   Nuclear Grade: Grade II (intermediate)   Necrosis: Not identified   Microcalcifications: Present in DCIS, Present in nonneoplastic tissue   MARGINS   Margins: Uninvolved by DCIS   Distance from Closest Margin (Millimeters): 5 mm   Closest Margin(s): Lateral (specimen #1), Posterior   LYMPH NODES   Regional Lymph Nodes: No lymph nodes submitted or found   PATHOLOGIC STAGE CLASSIFICATION (pTNM, AJCC 8th Edition)   Primary Tumor (pT): pTis (DCIS)   Regional Lymph Nodes (pN): pNX         Radiology report(s) reviewed above.       Assessment:   1) Left breast DCIS    S/P Lumpectomy 10/13/2020  Scattered foci ( T0) , concurrent LCIS  Grade 2  %    The risk of invasive+ non invasive events following a diagnosis of DCIS is about 15-50%, half of these events are invasive, risk of contralateral disease is 3-10%. BCT alone is associated with a risk of recurrence in the order of 20- 30% which is decreased by about 50% with the addition of XRT (NSABP B-17). Addition of endocrine therapy in HR+ DCIS further decreases the risk of recurrent breast events by about 30-40% (including contralateral breast events). It was reiterated that neither modality is shown to improve OS. Met with Dr. Malena Marshall and decided against RT  Tolerating Letrozole well    2) DMII    3) HTN    4) CAD    5) Obesity    6) Psychosocial        Plan:       · VD and Ca   · Letrozole daily for 5 years  · DEXA  · Discuss genetic testing next visit  · RTC 12 months    I appreciate the opportunity to participate in Ms. Herberth Love's care.     Signed By: Sumaya Alejo MD

## 2021-02-09 NOTE — PROGRESS NOTES
Lauro Platt is a 63 y.o. female  Chief Complaint   Patient presents with    Follow-up     Left breast DCIS. 1. Have you been to the ER, urgent care clinic since your last visit? Hospitalized since your last visit? Yes Reason for visit: intermittent episodic nonbloody vomiting and diarrhea for 1 week    2. Have you seen or consulted any other health care providers outside of the [address] [place] [address] since your last visit? Include any pap smears or colon screening.  No

## 2021-02-23 ENCOUNTER — TRANSCRIBE ORDER (OUTPATIENT)
Dept: SCHEDULING | Age: 61
End: 2021-02-23

## 2021-02-23 DIAGNOSIS — M85.80 OSTEOPENIA: Primary | ICD-10-CM

## 2021-02-23 DIAGNOSIS — D05.10 DCIS (DUCTAL CARCINOMA IN SITU): Primary | ICD-10-CM

## 2021-02-23 DIAGNOSIS — N95.1 MENOPAUSAL SYMPTOMS: Primary | ICD-10-CM

## 2021-03-01 NOTE — PROGRESS NOTES
HISTORY OF PRESENT ILLNESS  Eloy Hunt is a 63 y.o. female. HPI Established patient presents for follow-up to LEFT breast cancer. Denies breast mass, skin changes, nipple discharge and pain. Breast cancer-  Referring - Dr. Josse Gilbert  8/25/20 - She was found to have suspicious calcifications on a mammogram   9/14/20 - Subsequent bx showed DCIS - LEFT breast - ER pos  10/13/2020 - LEFT breast lumpectomy. Surg path shows DCIS, margins clear - Dr. Leonel Dodson  12/2020 - consult with Dr. Amanda España - patient declined XRT  1/2021 - started letrozole - Dr. Javy Dimas  Family history-  Sister diagnosed with breast cancer 63. Still living. Two maternal cousins (who were sisters) were diagnosed with breast cancer in their 63's. Both still living. OB History    No obstetric history on file. Obstetric Comments   Menarche:  7. LMP: 48.  # of Children:  0. Age at Delivery of First Child:  n/a. Hysterectomy/oophorectomy:  NO/NO. Breast Bx:  yes. Hx of Breast Feeding:  no. BCP:  no. Hormone therapy:  no.                    Past Surgical History:   Procedure Laterality Date    HX BREAST LUMPECTOMY Left 10/13/2020    LEFT BREAST LUMPECTOMY WITH ULTRASOUND AND MARGIN PROBE performed by Yosef Reid MD at [address] [facility]      COLONOSCOPY    HX ORTHOPAEDIC Right 2015    REPAIR OF FLAT FOOT    [place] CARDIAC SURG PROCEDURE UNLIST  2009    4 STENTS IN APRIL AND 5 STENTS IN SEPTEMBER       ROS    Physical Exam  Constitutional:       Appearance: Normal appearance. Chest:      Breasts:         Right: No mass, nipple discharge, skin change or tenderness. Left: No mass, nipple discharge, skin change or tenderness. Musculoskeletal: Normal range of motion. Comments: UE x 2   Lymphadenopathy:      Upper Body:      Right upper body: No supraclavicular or axillary adenopathy. Left upper body: No supraclavicular or axillary adenopathy.    Neurological:      Mental Status: She is alert.   Psychiatric:         Attention and Perception: Attention normal.         Mood and Affect: Mood normal.         Speech: Speech normal.         Behavior: Behavior normal.       Visit Vitals  /86   Pulse 73   Temp 98.3 °F (36.8 °C)   Ht 5' 4\" (1.626 m)   Wt 208 lb (94.3 kg)   BMI 35.70 kg/m²         ASSESSMENT and PLAN  Encounter Diagnoses   Name Primary?  Ductal carcinoma in situ (DCIS) of left breast Yes    S/P lumpectomy of breast     History of breast cancer in female         Normal exam with no evidence of local recurrence. Well healed incision to LEFT UOQ. Discussed normal post treatment changes. Taking letrozole and tolerating well. Was initially having hot flashes, but those have resolved. Dr. Williams Edwards requested she have a DEXA and she will schedule that. BDmammogram 3D in 7/2021. RTC in 6 months or sooner PRN. She is comfortable with this plan. All questions answered and she stated understanding. Total time spent for this patient - 20 minutes.

## 2021-03-02 ENCOUNTER — OFFICE VISIT (OUTPATIENT)
Dept: SURGERY | Age: 61
End: 2021-03-02
Payer: MEDICARE

## 2021-03-02 VITALS
HEIGHT: 64 IN | HEART RATE: 73 BPM | TEMPERATURE: 98.3 F | WEIGHT: 208 LBS | BODY MASS INDEX: 35.51 KG/M2 | SYSTOLIC BLOOD PRESSURE: 131 MMHG | DIASTOLIC BLOOD PRESSURE: 86 MMHG

## 2021-03-02 DIAGNOSIS — Z85.3 HISTORY OF BREAST CANCER IN FEMALE: ICD-10-CM

## 2021-03-02 DIAGNOSIS — D05.12 DUCTAL CARCINOMA IN SITU (DCIS) OF LEFT BREAST: Primary | ICD-10-CM

## 2021-03-02 DIAGNOSIS — Z98.890 S/P LUMPECTOMY OF BREAST: ICD-10-CM

## 2021-03-02 PROCEDURE — G8427 DOCREV CUR MEDS BY ELIG CLIN: HCPCS | Performed by: NURSE PRACTITIONER

## 2021-03-02 PROCEDURE — G8417 CALC BMI ABV UP PARAM F/U: HCPCS | Performed by: NURSE PRACTITIONER

## 2021-03-02 PROCEDURE — G8432 DEP SCR NOT DOC, RNG: HCPCS | Performed by: NURSE PRACTITIONER

## 2021-03-02 PROCEDURE — G9899 SCRN MAM PERF RSLTS DOC: HCPCS | Performed by: NURSE PRACTITIONER

## 2021-03-02 PROCEDURE — 99213 OFFICE O/P EST LOW 20 MIN: CPT | Performed by: NURSE PRACTITIONER

## 2021-03-02 PROCEDURE — 3017F COLORECTAL CA SCREEN DOC REV: CPT | Performed by: NURSE PRACTITIONER

## 2021-03-02 NOTE — PATIENT INSTRUCTIONS

## 2021-03-17 ENCOUNTER — TRANSCRIBE ORDER (OUTPATIENT)
Dept: SCHEDULING | Age: 61
End: 2021-03-17

## 2021-03-17 DIAGNOSIS — C50.919 BREAST CANCER (HCC): Primary | ICD-10-CM

## 2021-03-17 DIAGNOSIS — Z13.820 SCREENING FOR OSTEOPOROSIS: ICD-10-CM

## 2021-03-17 DIAGNOSIS — M81.0 SENILE OSTEOPOROSIS: ICD-10-CM

## 2021-03-23 ENCOUNTER — HOSPITAL ENCOUNTER (OUTPATIENT)
Dept: MAMMOGRAPHY | Age: 61
Discharge: HOME | End: 2021-03-23
Attending: INTERNAL MEDICINE
Payer: MEDICARE

## 2021-03-23 DIAGNOSIS — Z13.820 SCREENING FOR OSTEOPOROSIS: ICD-10-CM

## 2021-03-23 DIAGNOSIS — M81.0 SENILE OSTEOPOROSIS: ICD-10-CM

## 2021-03-23 DIAGNOSIS — C50.919 BREAST CANCER (HCC): ICD-10-CM

## 2021-03-23 PROCEDURE — 77080 DXA BONE DENSITY AXIAL: CPT

## 2021-05-19 NOTE — PROGRESS NOTES
Patient tolerated left breast biopsy well with minimal bleeding. Biopsy site was bandaged in the usual fashion and discharge instructions were reviewed with the patient. She was provided with a written copy as well. Advised patient that results should be available by Wednesday and that she will receive a phone call in the afternoon. Encouraged her to call with any questions or concerns. Initiate Treatment: Mix Mupirocin and TAC together bid x10 days. If no improvement within 3 weeks, needs bx. Detail Level: Zone

## 2021-09-30 ENCOUNTER — OFFICE VISIT (OUTPATIENT)
Dept: SURGERY | Age: 61
End: 2021-09-30
Payer: MEDICARE

## 2021-09-30 VITALS — DIASTOLIC BLOOD PRESSURE: 83 MMHG | SYSTOLIC BLOOD PRESSURE: 158 MMHG

## 2021-09-30 DIAGNOSIS — Z85.3 HISTORY OF BREAST CANCER IN FEMALE: ICD-10-CM

## 2021-09-30 DIAGNOSIS — Z98.890 S/P LUMPECTOMY OF BREAST: ICD-10-CM

## 2021-09-30 DIAGNOSIS — D05.12 DUCTAL CARCINOMA IN SITU (DCIS) OF LEFT BREAST: Primary | ICD-10-CM

## 2021-09-30 PROCEDURE — G8427 DOCREV CUR MEDS BY ELIG CLIN: HCPCS | Performed by: NURSE PRACTITIONER

## 2021-09-30 PROCEDURE — 99213 OFFICE O/P EST LOW 20 MIN: CPT | Performed by: NURSE PRACTITIONER

## 2021-09-30 PROCEDURE — 3017F COLORECTAL CA SCREEN DOC REV: CPT | Performed by: NURSE PRACTITIONER

## 2021-09-30 PROCEDURE — G9899 SCRN MAM PERF RSLTS DOC: HCPCS | Performed by: NURSE PRACTITIONER

## 2021-09-30 PROCEDURE — G8432 DEP SCR NOT DOC, RNG: HCPCS | Performed by: NURSE PRACTITIONER

## 2021-09-30 PROCEDURE — G8417 CALC BMI ABV UP PARAM F/U: HCPCS | Performed by: NURSE PRACTITIONER

## 2021-09-30 NOTE — PATIENT INSTRUCTIONS

## 2021-09-30 NOTE — PROGRESS NOTES
HISTORY OF PRESENT ILLNESS  Hafsa Gerber is a 63 y.o. female. HPI Established patient presents for follow-up to LEFT breast cancer. Denies breast mass, skin changes, nipple discharge and pain.          Breast cancer-  Referring - Dr. Shaista Samayoa  8/25/20 - She was found to have suspicious calcifications on a mammogram   9/14/20 - Subsequent bx showed DCIS - LEFT breast - ER pos  10/13/2020 - LEFT breast lumpectomy. Surg path shows DCIS, margins clear - Dr. Jareth Stacy  12/2020 - consult with Dr. Rhonda Tompkins - patient declined XRT  1/2021 - started letrozole - Dr. Sarahi Lopez history-  Sister diagnosed with breast cancer 61. Still living. Two maternal cousins (who were sisters) were diagnosed with breast cancer in their 63's. Both still living. OB History    No obstetric history on file. Obstetric Comments   Menarche:  7. LMP: 48.  # of Children:  0. Age at Delivery of First Child:  n/a. Hysterectomy/oophorectomy:  NO/NO. Breast Bx:  yes. Hx of Breast Feeding:  no. BCP:  no. Hormone therapy:  no.                    Past Surgical History:   Procedure Laterality Date    HX BREAST LUMPECTOMY Left 10/13/2020    LEFT BREAST LUMPECTOMY WITH ULTRASOUND AND MARGIN PROBE performed by Rhonda Caceres MD at [address] [facility]      COLONOSCOPY    HX ORTHOPAEDIC Right 2015    REPAIR OF FLAT FOOT    [place] CARDIAC SURG PROCEDURE UNLIST  2009    4 STENTS IN [facility] AND 5 STENTS IN SEPTEMBER         ROS    Physical Exam  Constitutional:       Appearance: Normal appearance. Chest:      Breasts:         Right: No mass, nipple discharge, skin change or tenderness. Left: No mass, nipple discharge, skin change or tenderness. Musculoskeletal:      Comments: FROM - UE x 2   Lymphadenopathy:      Upper Body:      Right upper body: No supraclavicular or axillary adenopathy. Left upper body: No supraclavicular or axillary adenopathy. Neurological:      Mental Status: She is alert. Psychiatric:         Attention and Perception: Attention normal.         Mood and Affect: Mood normal.         Speech: Speech normal.         Behavior: Behavior normal.         Visit Vitals  BP (!) 158/83 (BP 1 Location: Left upper arm, BP Patient Position: Sitting, BP Cuff Size: Adult)       ASSESSMENT and PLAN  Encounter Diagnoses   Name Primary?  Ductal carcinoma in situ (DCIS) of left breast Yes    S/P lumpectomy of breast     History of breast cancer in female           Normal exam with no evidence of local recurrence. Well healed incision to LEFT UOQ. Discussed normal post treatment changes. Taking letrozole and tolerating well. Has follow-up with Dr. Beatris Paris. BDmammogram 3D - due for this. Discussed importance of follow-up imaging. She will call to schedule. RTC in 6 months or sooner PRN. She is comfortable with this plan. All questions answered and she stated understanding. Total time spent for this patient - 20 minutes.

## 2021-11-16 ENCOUNTER — TRANSCRIBE ORDER (OUTPATIENT)
Dept: SCHEDULING | Age: 61
End: 2021-11-16

## 2021-11-16 DIAGNOSIS — D05.12 INTRADUCTAL CARCINOMA IN SITU OF LEFT BREAST: Primary | ICD-10-CM

## 2022-02-10 ENCOUNTER — TELEPHONE (OUTPATIENT)
Dept: ONCOLOGY | Age: 62
End: 2022-02-10

## 2022-03-14 ENCOUNTER — TRANSCRIBE ORDER (OUTPATIENT)
Dept: SCHEDULING | Age: 62
End: 2022-03-14

## 2022-03-14 DIAGNOSIS — R22.41 LOCALIZED SWELLING, MASS, OR LUMP OF LOWER EXTREMITY, RIGHT: Primary | ICD-10-CM

## 2022-03-19 PROBLEM — E66.01 CLASS 2 SEVERE OBESITY DUE TO EXCESS CALORIES WITH SERIOUS COMORBIDITY AND BODY MASS INDEX (BMI) OF 39.0 TO 39.9 IN ADULT (HCC): Status: ACTIVE | Noted: 2020-11-06

## 2022-03-20 PROBLEM — D05.12 DUCTAL CARCINOMA IN SITU (DCIS) OF LEFT BREAST: Status: ACTIVE | Noted: 2020-10-26

## 2022-03-20 PROBLEM — E11.9 TYPE 2 DIABETES MELLITUS WITHOUT COMPLICATION, WITHOUT LONG-TERM CURRENT USE OF INSULIN (HCC): Status: ACTIVE | Noted: 2020-11-06

## 2022-03-23 ENCOUNTER — TRANSCRIBE ORDER (OUTPATIENT)
Dept: SCHEDULING | Age: 62
End: 2022-03-23

## 2022-03-23 DIAGNOSIS — M79.673 FOOT PAIN: Primary | ICD-10-CM

## 2022-03-23 DIAGNOSIS — R60.0 EDEMA OF FOOT: ICD-10-CM

## 2022-04-13 ENCOUNTER — HOSPITAL ENCOUNTER (OUTPATIENT)
Dept: MRI IMAGING | Age: 62
Discharge: HOME | End: 2022-04-13
Attending: INTERNAL MEDICINE
Payer: MEDICARE

## 2022-04-13 DIAGNOSIS — R60.0 EDEMA OF FOOT: ICD-10-CM

## 2022-04-13 DIAGNOSIS — M79.673 FOOT PAIN: ICD-10-CM

## 2022-04-13 PROCEDURE — 73720 MRI LWR EXTREMITY W/O&W/DYE: CPT

## 2022-04-13 PROCEDURE — A9576 INJ PROHANCE MULTIPACK: HCPCS

## 2022-04-13 PROCEDURE — 73723 MRI JOINT LWR EXTR W/O&W/DYE: CPT

## 2022-04-13 PROCEDURE — 74011250636 HC RX REV CODE- 250/636

## 2022-04-13 RX ADMIN — GADOTERIDOL 20 ML: 279.3 INJECTION, SOLUTION INTRAVENOUS at 10:38

## 2022-06-21 ENCOUNTER — TRANSCRIBE ORDER (OUTPATIENT)
Dept: SCHEDULING | Age: 62
End: 2022-06-21

## 2022-06-21 DIAGNOSIS — Z12.31 SCREENING MAMMOGRAM FOR HIGH-RISK PATIENT: Primary | ICD-10-CM

## 2023-04-21 DIAGNOSIS — Z12.31 SCREENING MAMMOGRAM FOR HIGH-RISK PATIENT: Primary | ICD-10-CM

## 2024-06-12 ENCOUNTER — TRANSCRIBE ORDERS (OUTPATIENT)
Facility: HOSPITAL | Age: 64
End: 2024-06-12

## 2024-06-12 DIAGNOSIS — Z12.31 VISIT FOR SCREENING MAMMOGRAM: Primary | ICD-10-CM

## (undated) DEVICE — INFECTION CONTROL KIT SYS

## (undated) DEVICE — DRAPE,REIN 53X77,STERILE: Brand: MEDLINE

## (undated) DEVICE — SUTURE MCRYL SZ 4-0 L27IN ABSRB UD L19MM PS-2 1/2 CIR PRIM Y426H

## (undated) DEVICE — INSULATED BLADE ELECTRODE: Brand: EDGE

## (undated) DEVICE — PREP SKN CHLRAPRP APL 26ML STR --

## (undated) DEVICE — COVER US PRB W12XL244CM FLD IORT STR TIP

## (undated) DEVICE — GARMENT,MEDLINE,DVT,INT,CALF,MED, GEN2: Brand: MEDLINE

## (undated) DEVICE — TUBING, SUCTION, 1/4" X 12', STRAIGHT: Brand: MEDLINE

## (undated) DEVICE — PACK,BASIC,SIRUS,V: Brand: MEDLINE

## (undated) DEVICE — SPONGE GZ W4XL4IN COT 12 PLY TYP VII WVN C FLD DSGN

## (undated) DEVICE — SYR 10ML LUER LOK 1/5ML GRAD --

## (undated) DEVICE — BRA SURG 3XL FOR 52-55IN CHST LYCRA FR HK LOOP CLSR FOR

## (undated) DEVICE — DERMABOND SKIN ADH 0.7ML -- DERMABOND ADVANCED 12/BX

## (undated) DEVICE — SUT SLK 2-0SH 30IN BLK --

## (undated) DEVICE — SPONGE LAP 18X18IN STRL -- 5/PK

## (undated) DEVICE — PROBE DETECTION F/LUMPECTOMY -- ORDER IN MULTIPLES OF 5 EA ..MARGINPROBE

## (undated) DEVICE — PENCIL SMK EVAC L10FT DIA95MM TBNG NONSTICK W ADPT TO 22MM

## (undated) DEVICE — HANDLE LT SNAP ON ULT DURABLE LENS FOR TRUMPF ALC DISPOSABLE

## (undated) DEVICE — SOLUTION IV 1000ML 0.9% SOD CHL

## (undated) DEVICE — YANKAUER,TAPERED BULBOUS TIP,W/O VENT: Brand: MEDLINE

## (undated) DEVICE — DRAPE,CHEST,FENES,15X10,STERIL: Brand: MEDLINE

## (undated) DEVICE — REM POLYHESIVE ADULT PATIENT RETURN ELECTRODE: Brand: VALLEYLAB

## (undated) DEVICE — SURGICAL PROCEDURE PACK BASIN MAJ SET CUST NO CAUT

## (undated) DEVICE — TOWEL SURG W17XL27IN STD BLU COT NONFENESTRATED PREWASHED

## (undated) DEVICE — 1010 S-DRAPE TOWEL DRAPE 10/BX: Brand: STERI-DRAPE™

## (undated) DEVICE — STERILE POLYISOPRENE POWDER-FREE SURGICAL GLOVES WITH EMOLLIENT COATING: Brand: PROTEXIS